# Patient Record
Sex: MALE | Race: WHITE | NOT HISPANIC OR LATINO | Employment: OTHER | URBAN - METROPOLITAN AREA
[De-identification: names, ages, dates, MRNs, and addresses within clinical notes are randomized per-mention and may not be internally consistent; named-entity substitution may affect disease eponyms.]

---

## 2018-03-13 ENCOUNTER — COMMUNICATION - HEALTHEAST (OUTPATIENT)
Dept: INTERNAL MEDICINE | Facility: CLINIC | Age: 68
End: 2018-03-13

## 2018-03-13 ENCOUNTER — OFFICE VISIT - HEALTHEAST (OUTPATIENT)
Dept: INTERNAL MEDICINE | Facility: CLINIC | Age: 68
End: 2018-03-13

## 2018-03-13 DIAGNOSIS — Z00.00 ENCOUNTER FOR PREVENTIVE HEALTH EXAMINATION: ICD-10-CM

## 2018-03-13 DIAGNOSIS — Z23 INFLUENZA VACCINATION GIVEN: ICD-10-CM

## 2018-03-13 DIAGNOSIS — Z12.11 SCREENING FOR COLON CANCER: ICD-10-CM

## 2018-03-13 DIAGNOSIS — Z00.00 MEDICARE ANNUAL WELLNESS VISIT, INITIAL: ICD-10-CM

## 2018-03-13 DIAGNOSIS — E78.5 HYPERLIPIDEMIA: ICD-10-CM

## 2018-03-13 DIAGNOSIS — Z12.5 SCREENING FOR PROSTATE CANCER: ICD-10-CM

## 2018-03-13 DIAGNOSIS — Z76.89 ENCOUNTER TO ESTABLISH CARE WITH NEW DOCTOR: ICD-10-CM

## 2018-03-13 LAB
ALBUMIN SERPL-MCNC: 3.8 G/DL (ref 3.5–5)
ALP SERPL-CCNC: 55 U/L (ref 45–120)
ALT SERPL W P-5'-P-CCNC: 23 U/L (ref 0–45)
ANION GAP SERPL CALCULATED.3IONS-SCNC: 10 MMOL/L (ref 5–18)
AST SERPL W P-5'-P-CCNC: 18 U/L (ref 0–40)
BASOPHILS # BLD AUTO: 0 THOU/UL (ref 0–0.2)
BASOPHILS NFR BLD AUTO: 1 % (ref 0–2)
BILIRUB SERPL-MCNC: 0.6 MG/DL (ref 0–1)
BUN SERPL-MCNC: 16 MG/DL (ref 8–22)
CALCIUM SERPL-MCNC: 9.3 MG/DL (ref 8.5–10.5)
CHLORIDE BLD-SCNC: 100 MMOL/L (ref 98–107)
CHOLEST SERPL-MCNC: 279 MG/DL
CO2 SERPL-SCNC: 26 MMOL/L (ref 22–31)
CREAT SERPL-MCNC: 0.93 MG/DL (ref 0.7–1.3)
EOSINOPHIL # BLD AUTO: 0.3 THOU/UL (ref 0–0.4)
EOSINOPHIL NFR BLD AUTO: 5 % (ref 0–6)
ERYTHROCYTE [DISTWIDTH] IN BLOOD BY AUTOMATED COUNT: 12.2 % (ref 11–14.5)
FASTING STATUS PATIENT QL REPORTED: YES
GFR SERPL CREATININE-BSD FRML MDRD: >60 ML/MIN/1.73M2
GLUCOSE BLD-MCNC: 91 MG/DL (ref 70–125)
HCT VFR BLD AUTO: 46.5 % (ref 40–54)
HDLC SERPL-MCNC: 44 MG/DL
HGB BLD-MCNC: 15.7 G/DL (ref 14–18)
LDLC SERPL CALC-MCNC: 206 MG/DL
LYMPHOCYTES # BLD AUTO: 2.1 THOU/UL (ref 0.8–4.4)
LYMPHOCYTES NFR BLD AUTO: 34 % (ref 20–40)
MCH RBC QN AUTO: 31.1 PG (ref 27–34)
MCHC RBC AUTO-ENTMCNC: 33.8 G/DL (ref 32–36)
MCV RBC AUTO: 92 FL (ref 80–100)
MONOCYTES # BLD AUTO: 0.5 THOU/UL (ref 0–0.9)
MONOCYTES NFR BLD AUTO: 8 % (ref 2–10)
NEUTROPHILS # BLD AUTO: 3.2 THOU/UL (ref 2–7.7)
NEUTROPHILS NFR BLD AUTO: 52 % (ref 50–70)
PLATELET # BLD AUTO: 215 THOU/UL (ref 140–440)
PMV BLD AUTO: 6.3 FL (ref 7–10)
POTASSIUM BLD-SCNC: 4.3 MMOL/L (ref 3.5–5)
PROT SERPL-MCNC: 7.2 G/DL (ref 6–8)
PSA SERPL-MCNC: 0.8 NG/ML (ref 0–4.5)
RBC # BLD AUTO: 5.04 MILL/UL (ref 4.4–6.2)
SODIUM SERPL-SCNC: 136 MMOL/L (ref 136–145)
TRIGL SERPL-MCNC: 147 MG/DL
WBC: 6 THOU/UL (ref 4–11)

## 2018-03-13 ASSESSMENT — MIFFLIN-ST. JEOR: SCORE: 1765.68

## 2018-03-19 ENCOUNTER — COMMUNICATION - HEALTHEAST (OUTPATIENT)
Dept: INTERNAL MEDICINE | Facility: CLINIC | Age: 68
End: 2018-03-19

## 2018-03-20 ENCOUNTER — AMBULATORY - HEALTHEAST (OUTPATIENT)
Dept: INTERNAL MEDICINE | Facility: CLINIC | Age: 68
End: 2018-03-20

## 2018-03-20 ENCOUNTER — COMMUNICATION - HEALTHEAST (OUTPATIENT)
Dept: INTERNAL MEDICINE | Facility: CLINIC | Age: 68
End: 2018-03-20

## 2018-08-06 ENCOUNTER — OFFICE VISIT - HEALTHEAST (OUTPATIENT)
Dept: INTERNAL MEDICINE | Facility: CLINIC | Age: 68
End: 2018-08-06

## 2018-08-06 DIAGNOSIS — M10.9 ACUTE GOUT: ICD-10-CM

## 2018-08-06 DIAGNOSIS — Z12.11 SCREENING FOR COLON CANCER: ICD-10-CM

## 2018-08-06 DIAGNOSIS — E78.2 MIXED HYPERLIPIDEMIA: ICD-10-CM

## 2018-08-06 LAB
ALBUMIN SERPL-MCNC: 3.8 G/DL (ref 3.5–5)
ALP SERPL-CCNC: 67 U/L (ref 45–120)
ALT SERPL W P-5'-P-CCNC: 25 U/L (ref 0–45)
ANION GAP SERPL CALCULATED.3IONS-SCNC: 9 MMOL/L (ref 5–18)
AST SERPL W P-5'-P-CCNC: 20 U/L (ref 0–40)
BILIRUB SERPL-MCNC: 1.2 MG/DL (ref 0–1)
BUN SERPL-MCNC: 11 MG/DL (ref 8–22)
CALCIUM SERPL-MCNC: 9.4 MG/DL (ref 8.5–10.5)
CHLORIDE BLD-SCNC: 103 MMOL/L (ref 98–107)
CHOLEST SERPL-MCNC: 163 MG/DL
CO2 SERPL-SCNC: 26 MMOL/L (ref 22–31)
CREAT SERPL-MCNC: 0.94 MG/DL (ref 0.7–1.3)
FASTING STATUS PATIENT QL REPORTED: YES
GFR SERPL CREATININE-BSD FRML MDRD: >60 ML/MIN/1.73M2
GLUCOSE BLD-MCNC: 90 MG/DL (ref 70–125)
HDLC SERPL-MCNC: 38 MG/DL
LDLC SERPL CALC-MCNC: 105 MG/DL
POTASSIUM BLD-SCNC: 4.2 MMOL/L (ref 3.5–5)
PROT SERPL-MCNC: 7 G/DL (ref 6–8)
SODIUM SERPL-SCNC: 138 MMOL/L (ref 136–145)
TRIGL SERPL-MCNC: 101 MG/DL

## 2018-08-07 ENCOUNTER — AMBULATORY - HEALTHEAST (OUTPATIENT)
Dept: LAB | Facility: CLINIC | Age: 68
End: 2018-08-07

## 2018-08-07 DIAGNOSIS — M10.9 ACUTE GOUT: ICD-10-CM

## 2018-08-07 LAB — URATE SERPL-MCNC: 6.8 MG/DL (ref 3–8)

## 2018-08-08 ENCOUNTER — COMMUNICATION - HEALTHEAST (OUTPATIENT)
Dept: INTERNAL MEDICINE | Facility: CLINIC | Age: 68
End: 2018-08-08

## 2018-08-08 LAB
SPECIMEN VOL UR: 1600 ML
URATE UR-MCNC: 49.3 MG/DL
URIC ACID URINE MG/SPEC: 789 MG/24HR (ref 250–750)

## 2019-03-27 ENCOUNTER — COMMUNICATION - HEALTHEAST (OUTPATIENT)
Dept: INTERNAL MEDICINE | Facility: CLINIC | Age: 69
End: 2019-03-27

## 2019-03-27 DIAGNOSIS — R06.83 SNORING: ICD-10-CM

## 2019-04-03 ENCOUNTER — COMMUNICATION - HEALTHEAST (OUTPATIENT)
Dept: INTERNAL MEDICINE | Facility: CLINIC | Age: 69
End: 2019-04-03

## 2019-04-03 DIAGNOSIS — E78.2 MIXED HYPERLIPIDEMIA: ICD-10-CM

## 2019-05-10 ENCOUNTER — COMMUNICATION - HEALTHEAST (OUTPATIENT)
Dept: INTERNAL MEDICINE | Facility: CLINIC | Age: 69
End: 2019-05-10

## 2019-06-07 ENCOUNTER — OFFICE VISIT - HEALTHEAST (OUTPATIENT)
Dept: SLEEP MEDICINE | Facility: CLINIC | Age: 69
End: 2019-06-07

## 2019-06-07 DIAGNOSIS — G47.8 SLEEP DYSFUNCTION WITH SLEEP STAGE DISTURBANCE: ICD-10-CM

## 2019-06-07 DIAGNOSIS — R06.83 SNORING: ICD-10-CM

## 2019-06-07 DIAGNOSIS — R03.0 ELEVATED BLOOD PRESSURE READING: ICD-10-CM

## 2019-06-07 DIAGNOSIS — G47.10 HYPERSOMNIA: ICD-10-CM

## 2019-06-07 ASSESSMENT — MIFFLIN-ST. JEOR: SCORE: 1772.77

## 2019-07-01 ENCOUNTER — COMMUNICATION - HEALTHEAST (OUTPATIENT)
Dept: SLEEP MEDICINE | Facility: CLINIC | Age: 69
End: 2019-07-01

## 2019-07-01 DIAGNOSIS — G47.8 SLEEP DYSFUNCTION WITH SLEEP STAGE DISTURBANCE: ICD-10-CM

## 2019-07-01 DIAGNOSIS — R06.83 SNORING: ICD-10-CM

## 2019-07-01 DIAGNOSIS — G47.10 HYPERSOMNIA: ICD-10-CM

## 2019-07-16 ENCOUNTER — RECORDS - HEALTHEAST (OUTPATIENT)
Dept: ADMINISTRATIVE | Facility: OTHER | Age: 69
End: 2019-07-16

## 2019-07-16 ENCOUNTER — RECORDS - HEALTHEAST (OUTPATIENT)
Dept: SLEEP MEDICINE | Facility: CLINIC | Age: 69
End: 2019-07-16

## 2019-07-16 DIAGNOSIS — R06.83 SNORING: ICD-10-CM

## 2019-07-16 DIAGNOSIS — G47.8 OTHER SLEEP DISORDERS: ICD-10-CM

## 2019-07-16 DIAGNOSIS — G47.10 HYPERSOMNIA, UNSPECIFIED: ICD-10-CM

## 2019-07-19 ENCOUNTER — COMMUNICATION - HEALTHEAST (OUTPATIENT)
Dept: SLEEP MEDICINE | Facility: CLINIC | Age: 69
End: 2019-07-19

## 2019-08-29 ENCOUNTER — OFFICE VISIT - HEALTHEAST (OUTPATIENT)
Dept: SLEEP MEDICINE | Facility: CLINIC | Age: 69
End: 2019-08-29

## 2019-08-29 DIAGNOSIS — G47.33 OBSTRUCTIVE SLEEP APNEA: ICD-10-CM

## 2019-08-29 DIAGNOSIS — G47.10 HYPERSOMNIA: ICD-10-CM

## 2019-08-29 ASSESSMENT — MIFFLIN-ST. JEOR: SCORE: 1781.84

## 2019-09-10 ENCOUNTER — AMBULATORY - HEALTHEAST (OUTPATIENT)
Dept: SLEEP MEDICINE | Facility: CLINIC | Age: 69
End: 2019-09-10

## 2019-10-11 ENCOUNTER — COMMUNICATION - HEALTHEAST (OUTPATIENT)
Dept: INTERNAL MEDICINE | Facility: CLINIC | Age: 69
End: 2019-10-11

## 2019-10-11 DIAGNOSIS — E78.2 MIXED HYPERLIPIDEMIA: ICD-10-CM

## 2019-10-22 ENCOUNTER — OFFICE VISIT - HEALTHEAST (OUTPATIENT)
Dept: SLEEP MEDICINE | Facility: CLINIC | Age: 69
End: 2019-10-22

## 2019-10-22 DIAGNOSIS — R03.0 ELEVATED BLOOD PRESSURE READING: ICD-10-CM

## 2019-10-22 DIAGNOSIS — G47.33 OBSTRUCTIVE SLEEP APNEA: ICD-10-CM

## 2019-10-22 DIAGNOSIS — G47.10 HYPERSOMNIA: ICD-10-CM

## 2019-10-22 ASSESSMENT — MIFFLIN-ST. JEOR: SCORE: 1818.13

## 2019-10-24 ENCOUNTER — OFFICE VISIT - HEALTHEAST (OUTPATIENT)
Dept: INTERNAL MEDICINE | Facility: CLINIC | Age: 69
End: 2019-10-24

## 2019-10-24 DIAGNOSIS — M54.41 ACUTE RIGHT-SIDED LOW BACK PAIN WITH RIGHT-SIDED SCIATICA: ICD-10-CM

## 2019-10-24 ASSESSMENT — MIFFLIN-ST. JEOR: SCORE: 1799.98

## 2019-10-29 ENCOUNTER — COMMUNICATION - HEALTHEAST (OUTPATIENT)
Dept: INTERNAL MEDICINE | Facility: CLINIC | Age: 69
End: 2019-10-29

## 2019-10-29 DIAGNOSIS — M54.41 ACUTE BILATERAL LOW BACK PAIN WITH RIGHT-SIDED SCIATICA: ICD-10-CM

## 2019-11-06 ENCOUNTER — HOSPITAL ENCOUNTER (OUTPATIENT)
Dept: MRI IMAGING | Facility: CLINIC | Age: 69
Discharge: HOME | End: 2019-11-06
Attending: INTERNAL MEDICINE

## 2019-11-06 DIAGNOSIS — M54.41 ACUTE BILATERAL LOW BACK PAIN WITH RIGHT-SIDED SCIATICA: ICD-10-CM

## 2019-11-10 ENCOUNTER — COMMUNICATION - HEALTHEAST (OUTPATIENT)
Dept: INTERNAL MEDICINE | Facility: CLINIC | Age: 69
End: 2019-11-10

## 2019-11-10 DIAGNOSIS — M54.41 ACUTE BILATERAL LOW BACK PAIN WITH RIGHT-SIDED SCIATICA: ICD-10-CM

## 2019-11-10 DIAGNOSIS — M48.061 SPINAL STENOSIS, LUMBAR REGION, WITHOUT NEUROGENIC CLAUDICATION: ICD-10-CM

## 2019-11-19 ENCOUNTER — COMMUNICATION - HEALTHEAST (OUTPATIENT)
Dept: PHYSICAL MEDICINE AND REHAB | Facility: CLINIC | Age: 69
End: 2019-11-19

## 2019-11-19 ENCOUNTER — HOSPITAL ENCOUNTER (OUTPATIENT)
Dept: PHYSICAL MEDICINE AND REHAB | Facility: CLINIC | Age: 69
Discharge: HOME | End: 2019-11-19
Attending: INTERNAL MEDICINE

## 2019-11-19 DIAGNOSIS — M48.061 SPINAL STENOSIS OF LUMBAR REGION WITHOUT NEUROGENIC CLAUDICATION: ICD-10-CM

## 2019-11-19 DIAGNOSIS — M54.16 LUMBAR RADICULITIS: ICD-10-CM

## 2019-11-19 ASSESSMENT — MIFFLIN-ST. JEOR: SCORE: 1799.08

## 2019-11-26 ENCOUNTER — COMMUNICATION - HEALTHEAST (OUTPATIENT)
Dept: PHYSICAL MEDICINE AND REHAB | Facility: CLINIC | Age: 69
End: 2019-11-26

## 2019-11-27 ENCOUNTER — OFFICE VISIT - HEALTHEAST (OUTPATIENT)
Dept: PHYSICAL THERAPY | Facility: REHABILITATION | Age: 69
End: 2019-11-27

## 2019-11-27 DIAGNOSIS — R29.6 FALLS FREQUENTLY: ICD-10-CM

## 2019-11-27 DIAGNOSIS — M62.81 GENERALIZED MUSCLE WEAKNESS: ICD-10-CM

## 2019-11-27 DIAGNOSIS — M54.41 ACUTE RIGHT-SIDED LOW BACK PAIN WITH RIGHT-SIDED SCIATICA: ICD-10-CM

## 2019-12-02 ENCOUNTER — COMMUNICATION - HEALTHEAST (OUTPATIENT)
Dept: PHYSICAL THERAPY | Facility: REHABILITATION | Age: 69
End: 2019-12-02

## 2019-12-05 ENCOUNTER — OFFICE VISIT - HEALTHEAST (OUTPATIENT)
Dept: PHYSICAL THERAPY | Facility: REHABILITATION | Age: 69
End: 2019-12-05

## 2019-12-05 ENCOUNTER — HOSPITAL ENCOUNTER (OUTPATIENT)
Dept: PHYSICAL MEDICINE AND REHAB | Facility: CLINIC | Age: 69
Discharge: HOME | End: 2019-12-05
Attending: PHYSICIAN ASSISTANT

## 2019-12-05 DIAGNOSIS — R29.6 FALLS FREQUENTLY: ICD-10-CM

## 2019-12-05 DIAGNOSIS — M62.81 GENERALIZED MUSCLE WEAKNESS: ICD-10-CM

## 2019-12-05 DIAGNOSIS — M48.061 SPINAL STENOSIS OF LUMBAR REGION WITHOUT NEUROGENIC CLAUDICATION: ICD-10-CM

## 2019-12-05 DIAGNOSIS — M54.41 ACUTE RIGHT-SIDED LOW BACK PAIN WITH RIGHT-SIDED SCIATICA: ICD-10-CM

## 2019-12-11 ENCOUNTER — COMMUNICATION - HEALTHEAST (OUTPATIENT)
Dept: PHYSICAL MEDICINE AND REHAB | Facility: CLINIC | Age: 69
End: 2019-12-11

## 2019-12-11 DIAGNOSIS — M48.061 SPINAL STENOSIS OF LUMBAR REGION WITHOUT NEUROGENIC CLAUDICATION: ICD-10-CM

## 2019-12-12 ENCOUNTER — OFFICE VISIT - HEALTHEAST (OUTPATIENT)
Dept: PHYSICAL THERAPY | Facility: REHABILITATION | Age: 69
End: 2019-12-12

## 2019-12-12 DIAGNOSIS — M54.41 ACUTE RIGHT-SIDED LOW BACK PAIN WITH RIGHT-SIDED SCIATICA: ICD-10-CM

## 2019-12-12 DIAGNOSIS — M62.81 GENERALIZED MUSCLE WEAKNESS: ICD-10-CM

## 2019-12-12 DIAGNOSIS — R29.6 FALLS FREQUENTLY: ICD-10-CM

## 2019-12-19 ENCOUNTER — HOSPITAL ENCOUNTER (OUTPATIENT)
Dept: PHYSICAL MEDICINE AND REHAB | Facility: CLINIC | Age: 69
Discharge: HOME | End: 2019-12-19
Attending: PHYSICIAN ASSISTANT

## 2019-12-19 ENCOUNTER — OFFICE VISIT - HEALTHEAST (OUTPATIENT)
Dept: PHYSICAL THERAPY | Facility: REHABILITATION | Age: 69
End: 2019-12-19

## 2019-12-19 DIAGNOSIS — M48.061 SPINAL STENOSIS OF LUMBAR REGION WITHOUT NEUROGENIC CLAUDICATION: ICD-10-CM

## 2019-12-19 DIAGNOSIS — M54.41 ACUTE RIGHT-SIDED LOW BACK PAIN WITH RIGHT-SIDED SCIATICA: ICD-10-CM

## 2019-12-19 DIAGNOSIS — R29.6 FALLS FREQUENTLY: ICD-10-CM

## 2019-12-19 DIAGNOSIS — M54.16 LUMBAR RADICULITIS: ICD-10-CM

## 2019-12-19 DIAGNOSIS — M62.81 GENERALIZED MUSCLE WEAKNESS: ICD-10-CM

## 2019-12-24 ENCOUNTER — OFFICE VISIT - HEALTHEAST (OUTPATIENT)
Dept: PHYSICAL THERAPY | Facility: REHABILITATION | Age: 69
End: 2019-12-24

## 2019-12-24 DIAGNOSIS — M54.41 ACUTE RIGHT-SIDED LOW BACK PAIN WITH RIGHT-SIDED SCIATICA: ICD-10-CM

## 2019-12-24 DIAGNOSIS — M62.81 GENERALIZED MUSCLE WEAKNESS: ICD-10-CM

## 2019-12-24 DIAGNOSIS — R29.6 FALLS FREQUENTLY: ICD-10-CM

## 2020-01-09 ENCOUNTER — OFFICE VISIT - HEALTHEAST (OUTPATIENT)
Dept: PHYSICAL THERAPY | Facility: REHABILITATION | Age: 70
End: 2020-01-09

## 2020-01-09 DIAGNOSIS — M62.81 GENERALIZED MUSCLE WEAKNESS: ICD-10-CM

## 2020-01-09 DIAGNOSIS — R29.6 FALLS FREQUENTLY: ICD-10-CM

## 2020-01-09 DIAGNOSIS — M54.41 ACUTE RIGHT-SIDED LOW BACK PAIN WITH RIGHT-SIDED SCIATICA: ICD-10-CM

## 2020-04-08 ENCOUNTER — COMMUNICATION - HEALTHEAST (OUTPATIENT)
Dept: INTERNAL MEDICINE | Facility: CLINIC | Age: 70
End: 2020-04-08

## 2020-04-08 DIAGNOSIS — E78.2 MIXED HYPERLIPIDEMIA: ICD-10-CM

## 2020-11-16 ENCOUNTER — COMMUNICATION - HEALTHEAST (OUTPATIENT)
Dept: INTERNAL MEDICINE | Facility: CLINIC | Age: 70
End: 2020-11-16

## 2020-11-16 DIAGNOSIS — E78.2 MIXED HYPERLIPIDEMIA: ICD-10-CM

## 2021-01-29 ENCOUNTER — COMMUNICATION - HEALTHEAST (OUTPATIENT)
Dept: SCHEDULING | Facility: CLINIC | Age: 71
End: 2021-01-29

## 2021-02-09 ENCOUNTER — COMMUNICATION - HEALTHEAST (OUTPATIENT)
Dept: INTERNAL MEDICINE | Facility: CLINIC | Age: 71
End: 2021-02-09

## 2021-02-09 DIAGNOSIS — E78.2 MIXED HYPERLIPIDEMIA: ICD-10-CM

## 2021-02-12 ENCOUNTER — COMMUNICATION - HEALTHEAST (OUTPATIENT)
Dept: INTERNAL MEDICINE | Facility: CLINIC | Age: 71
End: 2021-02-12

## 2021-02-12 DIAGNOSIS — E78.2 MIXED HYPERLIPIDEMIA: ICD-10-CM

## 2021-02-15 RX ORDER — ATORVASTATIN CALCIUM 40 MG/1
TABLET, FILM COATED ORAL
Qty: 90 TABLET | Refills: 3 | Status: SHIPPED | OUTPATIENT
Start: 2021-02-15 | End: 2022-04-27

## 2021-04-28 ENCOUNTER — OFFICE VISIT - HEALTHEAST (OUTPATIENT)
Dept: INTERNAL MEDICINE | Facility: CLINIC | Age: 71
End: 2021-04-28

## 2021-04-28 DIAGNOSIS — R05.9 COUGH: ICD-10-CM

## 2021-05-27 NOTE — TELEPHONE ENCOUNTER
Refill Approved    Rx renewed per Medication Renewal Policy. Medication was last renewed on 3/20/18  #30 R-12.    Last OV 8/6/18    Madelaine Robles, [facility] Triage/Med Refill 4/4/2019     Requested Prescriptions   Pending Prescriptions Disp Refills     atorvastatin (LIPITOR) 40 MG tablet 90 tablet 0     Sig: Take 1 tablet (40 mg total) by mouth at bedtime.    Statins Refill Protocol (Hmg CoA Reductase Inhibitors) Passed - 4/3/2019 12:58 PM       Passed - PCP or prescribing provider visit in past 12 months     Last office visit with prescriber/PCP: 8/6/2018 Ryan Byrd MD OR same dept: 8/6/2018 Ryan Byrd MD OR same specialty: 8/6/2018 Ryan Byrd MD  Last physical: 3/13/2018 Last MTM visit: Visit date not found   Next visit within 3 mo: Visit date not found  Next physical within 3 mo: Visit date not found  Prescriber OR PCP: Ryan Byrd MD  Last diagnosis associated with med order: There are no diagnoses linked to this encounter.  If protocol passes may refill for 12 months if within 3 months of last provider visit (or a total of 15 months).

## 2021-05-29 NOTE — PROGRESS NOTES
Dear Dr. Byrd, Ryan ROGERS Md  [address] [place] [address]    Thank you for the opportunity to participate in the care of Mr. Delmar Archuleta.    He is a 68 y.o. male who comes to the clinic with a chief complaint of excessive daytime sleepiness is been going on for approximately a year.  Patient denies any episodes witnessed apnea he has been told that he does have loud snoring during sleep.  The patient also complains of frequent episodes of nocturnal awakening.  The patient's review of systems is otherwise negative.     Ideal Sleep-Wake Cycle(devoid of societal pressure):    Patient would try to initiate sleep at around 10:30 PM with a sleep latency of 5-10 minutes. The patient would have 2-3 awakenings. Final wake up time is around 7 AM.      Past Medical History  Past Medical History:   Diagnosis Date     Colon polyps      History of arm fracture      Hyperlipidemia         Past Surgical History  Past Surgical History:   Procedure Laterality Date     NO PAST SURGERIES          Meds  Current Outpatient Medications   Medication Sig Dispense Refill     aspirin 81 MG EC tablet Take 81 mg by mouth daily.       atorvastatin (LIPITOR) 40 MG tablet Take 1 tablet (40 mg total) by mouth at bedtime. 90 tablet 1     No current facility-administered medications for this visit.         Allergies  Patient has no known allergies.     Social History  Social History     Socioeconomic History     Marital status: Single     Spouse name: Not on file     Number of children: 2     Years of education: Not on file     Highest education level: Not on file   Occupational History     Occupation: building supply   Social Needs     Financial resource strain: Not on file     Food insecurity:     Worry: Not on file     Inability: Not on file     Transportation needs:     Medical: Not on file     Non-medical: Not on file   Tobacco Use     Smoking status: Current Every Day Smoker     Types: Cigars     Smokeless tobacco: Never  Used   Substance and Sexual Activity     Alcohol use: Yes     Alcohol/week: 4.8 oz     Types: 8 Cans of beer per week     Drug use: Not on file     Sexual activity: Yes     Partners: Female   Lifestyle     Physical activity:     Days per week: Not on file     Minutes per session: Not on file     Stress: Not on file   Relationships     Social connections:     Talks on phone: Not on file     Gets together: Not on file     Attends Congregation service: Not on file     Active member of club or organization: Not on file     Attends meetings of clubs or organizations: Not on file     Relationship status: Not on file     Intimate partner violence:     Fear of current or ex partner: Not on file     Emotionally abused: Not on file     Physically abused: Not on file     Forced sexual activity: Not on file   Other Topics Concern     Not on file   Social History Narrative    Grandkids, golf        Family History  Family History   Problem Relation Age of Onset     Coronary artery disease Father      Breast cancer Sister      Coronary artery disease Brother         Review of Systems:  Constitutional: Negative except as noted in HPI.   Eyes: Negative except as noted in HPI.   ENT: Negative except as noted in HPI.   Cardiovascular: Negative except as noted in HPI.   Respiratory: Negative except as noted in HPI.   Gastrointestinal: Negative except as noted in HPI.   Genitourinary: Negative except as noted in HPI.   Musculoskeletal: Negative except as noted in HPI.   Integumentary: Negative except as noted in HPI.   Neurological: Negative except as noted in HPI.   Psychiatric: Negative except as noted in HPI.   Endocrine: Negative except as noted in HPI.   Hematologic/Lymphatic: Negative except as noted in HPI.      STOP BANG 6/7/2019   Do you snore loudly (louder than talking or loud enough to be heard through closed doors)? 1   Do you often feel tired, fatigued, or sleepy during daytime? 1   Has anyone observed you stop breathing in  "your sleep? 0   Do you have or are you being treated for high blood pressure? 0   BMI more than 35 kg/m2 0   Age over 50 years old? 1   Neck circumference greater than 16 inches? 1   Gender male? 1   Total Score 5     Epworths Sleepiness Scale 6/7/2019   Sitting and reading 1   Watching TV 1   Sitting, inactive in a public place (e.g. a theatre or a meeting) 0   As a passenger in a car for an hour without a break 0   Lying down to rest in the afternoon when circumstances permit 2   Sitting and talking to someone 0   Sitting quietly after a lunch without alcohol 1   In a car, while stopped for a few minutes in traffic 0   Total score 5     Rooming 6/7/2019   Usual bedtime 10:30   Sleep Latency 5-10 minutes   Awakenings 2-3 times   Wake Up Time 7am   Weekends same   Energy Drinks no   Coffee 3 cups qd   Cola no   Difficulty falling asleep No   Difficulty staying asleep Yes   Excessive daytime tiredness Yes   Excessive daytime sleepiness Yes   Dozing off while driving No   Shift Worker No   Sleep Walking? No   Sleep Talking? No   Kicking or punching? No   Restless legs symptoms Yes       Physical Exam:  /83   Pulse 66   Ht 6' 2\" (1.88 m)   Wt 209 lb (94.8 kg)   SpO2 95%   BMI 26.83 kg/m    BMI:Body mass index is 26.83 kg/m .   GEN: NAD, appropriate for age  Head: Normocephalic.  EYES: PERRLA, EOMI  ENT: Oropharynx is clear, Nathan class 4+ airway.   Nasal mucosa is moist without erythema  Neck : Thyroid is within normal limits.  Neck circumference 16.75 inches  CV: Regular rate and rhythm, S1 & S2 positive.  LUNGS: Bilateral breathsounds heard.   ABDOMEN: Positive bowel sounds in all quadrants, soft, no rebound or guarding  MUSCULOSKELETAL: Bilateral trace leg swelling  SKIN: warm, dry, no rashes  Neurological: Alert, oriented to time, place, and person.  Psych: normal mood, normal affect     Labs/Studies:     Lab Results   Component Value Date    WBC 6.0 03/13/2018    HGB 15.7 03/13/2018    HCT 46.5 " 03/13/2018    MCV 92 03/13/2018     03/13/2018         Chemistry        Component Value Date/Time     08/06/2018 1116    K 4.2 08/06/2018 1116     08/06/2018 1116    CO2 26 08/06/2018 1116    BUN 11 08/06/2018 1116    CREATININE 0.94 08/06/2018 1116    GLU 90 08/06/2018 1116        Component Value Date/Time    CALCIUM 9.4 08/06/2018 1116    ALKPHOS 67 08/06/2018 1116    AST 20 08/06/2018 1116    ALT 25 08/06/2018 1116    BILITOT 1.2 (H) 08/06/2018 1116            No results found for: FERRITIN  No results found for: TSH      Assessment and Plan:  In summary Delmar Archuleta is a 68 y.o. year old male here for sleep disturbance.  1.  Hypersomnia   Mr. Delmar Archuleta has high risk for obstructive sleep apnea based on the history of hypersomnia, snoring and a crowded airway. I educated the patient on the underlying pathophysiology of obstructive sleep apnea. We reviewed the risks associated with sleep apnea, including increased cardiovascular risk and overall death. We talked about treatments briefly. I recommend getting an split-night nocturnal polysomnography. The patient should return to the clinic to discuss results and treatment option in a patient-centered approach.  2.  Snoring  3.  Elevated blood pressure reading  I will have the patient's blood pressure readings repeated during this clinic visit. I strongly advised the patient to follow up with PCP in one week.  4.  Other sleep disturbance    Patient verbalized understanding of these issues, agrees with the plan and all questions were answered today. Patient was given an opportuntity to voice any other symptoms or concerns not listed above. Patient did not have any other symptoms or concerns.         Leo Mckinley DO  Board Certified in Internal Medicine and Sleep Medicine  [facility].    (Note created with Dragon voice recognition and unintended spelling errors and word substitutions may occur)

## 2021-05-30 ENCOUNTER — RECORDS - HEALTHEAST (OUTPATIENT)
Dept: ADMINISTRATIVE | Facility: CLINIC | Age: 71
End: 2021-05-30

## 2021-05-30 NOTE — TELEPHONE ENCOUNTER
Please call the patient to schedule for an earlier follow-up visit to review the results of the sleep study.  Please schedule patient to follow-up with me within 6 weeks.  May double book except for my 1/2 days.Thank you.

## 2021-05-30 NOTE — TELEPHONE ENCOUNTER
----- Message from Marian Rowland, PSGT sent at 6/26/2019  3:34 PM CDT -----  Hi Dr Mckinley-  This patients in lab study was denied by insurance. I had Dr PUCKETT look at it and he didn't think there was enough co morbidities to leave as an in lab. Can you please change to an HST?  Thanks!  Marian

## 2021-05-31 VITALS — HEIGHT: 74 IN | WEIGHT: 207.44 LBS | BODY MASS INDEX: 26.62 KG/M2

## 2021-05-31 NOTE — PROGRESS NOTES
"Dear Dr. Byrd, Ryan ROGERS MD  [address] [place] [address],    Thank you for the opportunity to participate in the care of Delmar Archuleta.     He is a 68 y.o.  male patient who comes to the sleep medicine clinic for review of his sleep study. The study was completed on 07/16/2019 which showed the the patient had mild obstructive sleep apnea      Current Outpatient Medications   Medication Sig Dispense Refill     aspirin 81 MG EC tablet Take 81 mg by mouth daily.       atorvastatin (LIPITOR) 40 MG tablet Take 1 tablet (40 mg total) by mouth at bedtime. 90 tablet 1     No current facility-administered medications for this visit.        No Known Allergies    Epworths Sleepiness Scale 6/7/2019   Sitting and reading 1   Watching TV 1   Sitting, inactive in a public place (e.g. a theatre or a meeting) 0   As a passenger in a car for an hour without a break 0   Lying down to rest in the afternoon when circumstances permit 2   Sitting and talking to someone 0   Sitting quietly after a lunch without alcohol 1   In a car, while stopped for a few minutes in traffic 0   Total score 5       Physical Exam:  /83 (Patient Site: Left Arm)   Pulse 68   Ht 6' 2\" (1.88 m)   Wt 211 lb (95.7 kg)   SpO2 95%   BMI 27.09 kg/m    BMI:Body mass index is 27.09 kg/m .   GEN: NAD, obese  Psych: normal mood, normal affect     Labs/Studies:  - We reviewed the results of the overnight PSG as described on the HPI.     Lab Results   Component Value Date    WBC 6.0 03/13/2018    HGB 15.7 03/13/2018    HCT 46.5 03/13/2018    MCV 92 03/13/2018     03/13/2018         Chemistry        Component Value Date/Time     08/06/2018 1116    K 4.2 08/06/2018 1116     08/06/2018 1116    CO2 26 08/06/2018 1116    BUN 11 08/06/2018 1116    CREATININE 0.94 08/06/2018 1116    GLU 90 08/06/2018 1116        Component Value Date/Time    CALCIUM 9.4 08/06/2018 1116    ALKPHOS 67 08/06/2018 1116    AST 20 08/06/2018 1116    ALT " 25 08/06/2018 1116    BILITOT 1.2 (H) 08/06/2018 1116            No results found for: FERRITIN        Assessment and Plan:  In summary Delmar Archuleta is a 68 y.o. year old male here for review of his sleep study.  1. Obstructive Sleep Apnea  We had an extensive conversation to review the results of his sleep study and to  him on the importance of treating sleep apnea. Patient decided to proceed with CPAP. He will start using the device as soon as he receives it with the intention to use if for the entire night. We discussed some tips to increase PAP tolerance as well as the normal curve of adaptation. CPAP is going to provide improved respiratory function during the night but it can cause some sleep disruption that tends to improve with continuous usage. He should return to the clinic in 6 weeks to review compliance and efficacy monitoring. Since the patient does not have any preference, we will use [employer] as her [employer].   2.  Hypersomnia     Patient verbalized understanding of these issues, agrees with the plan and all questions were answered today. Patient was given an opportuntity to voice any other symptoms or concerns not listed above. Patient did not have any other symptoms or concerns.        Leo Mckinley DO  Board Certified in Internal Medicine and Sleep Medicine  [facility].      (Note created with Dragon voice recognition and unintended spelling errors and word substitutions may occur)

## 2021-05-31 NOTE — PROGRESS NOTES
Order for Durable Medical Equipment was processed and equipment ordered.     DME provider: [facility]    Date Faxed: 8/29/2019    Ordering Provider: Leo Mckinley DO    PAP Order Type: New Device Order    Fax Number: IN HOUSE DME: [id number]

## 2021-06-01 VITALS — BODY MASS INDEX: 26.19 KG/M2 | WEIGHT: 204 LBS

## 2021-06-01 NOTE — PROGRESS NOTES
Patient was offered choice of vendor and chose [facility].  Patient Delmar Archuleta was set up at [facility] on 9/16/19. Patient received a Resmed [id number] Auto. Pressures were set at 5-15.   Patient s ramp is 5 cm H2O for off and FLEX/EPR is EPR 2.  Patient received a Resmed Mask name: Steward FX  pillow Size med, heated tubing and heated humidifier.    Soe Her

## 2021-06-02 VITALS — HEIGHT: 74 IN | WEIGHT: 209 LBS | BODY MASS INDEX: 26.82 KG/M2

## 2021-06-02 NOTE — PROGRESS NOTES
ASSESSMENT AND PLAN:    1. Acute right-sided low back pain with right-sided sciatica  Exam and history are consistent with possible early sciatica pain.  Essentially only occurring at night.  Hip ROM is good, without focal tenderness.  No findings to suggest radiculopathy.  He is comfortable using pain rx, short term for now and following up if progresses. Possible this is trochanteric bursitis that may further more clearly develop.     - HYDROcodone-acetaminophen 5-325 mg per tablet; Take 1 tablet by mouth every 8 (eight) hours as needed (for acute pain).  Dispense: 18 tablet; Refill: 0    Patient Instructions   1. Discussed possible sciatica.       2. Use hydrocodone with acetaminophen sparingly     3. Follow as needed and in 6 months for general health.     CHIEF COMPLAINT:  Chief Complaint   Patient presents with     Leg Pain     X three days of right leg pain and mild lower back ache     HISTORY OF PRESENT ILLNESS:  Delmar Archuleta is a 68 y.o. male with lower right back pain and lateral RLE pain only occurring at night, the past few nights.  It doesn't seem to matter which side he lays on.  No tingling, or weakness of the RLE.  No voiding symtpoms.  No rash, no trauma.     REVIEW OF SYSTEMS:   See HPI, all other systems on review are negative.    Past Medical History:   Diagnosis Date     Colon polyps      History of arm fracture      Hyperlipidemia      Social History     Tobacco Use   Smoking Status Current Every Day Smoker     Types: Cigars   Smokeless Tobacco Never Used     Family History   Problem Relation Age of Onset     Coronary artery disease Father      Breast cancer Sister      Coronary artery disease Brother      Sleep apnea Brother      Past Surgical History:   Procedure Laterality Date     NO PAST SURGERIES       VITALS:  Vitals:    10/24/19 1457   BP: 136/80   Patient Site: Left Arm   Patient Position: Sitting   Cuff Size: Adult Regular   Pulse: 60   SpO2: 98%   Weight: 215 lb (97.5 kg)  "  Height: 6' 2\" (1.88 m)     Wt Readings from Last 3 Encounters:   10/24/19 215 lb (97.5 kg)   10/22/19 219 lb (99.3 kg)   08/29/19 211 lb (95.7 kg)     PHYSICAL EXAM:  Constitutional:  In NAD, alert and oriented  Abdomen:   Soft, flat and non-tender, without guarding, rebound, or mass.    Extremities: no joint effusion or inflammation, no significant edema  Neurologic:  SLR positive with hamstring pain bilaterally at about 50 degrees.  Reflexes are normal, no LE weakness.  Can walk on heels and toes.         Current Outpatient Medications   Medication Sig Dispense Refill     aspirin 81 MG EC tablet Take 81 mg by mouth daily.       atorvastatin (LIPITOR) 40 MG tablet Take 1 tablet (40 mg total) by mouth at bedtime. 90 tablet 1     HYDROcodone-acetaminophen 5-325 mg per tablet Take 1 tablet by mouth every 8 (eight) hours as needed (for acute pain). 18 tablet 0     No current facility-administered medications for this visit.      Ryan Bydr MD  Internal Medicine  [facility]  "

## 2021-06-02 NOTE — PATIENT INSTRUCTIONS - HE
Please follow up with your primary care provider to check your blood pressure in one week if clinically indicated.

## 2021-06-02 NOTE — PATIENT INSTRUCTIONS - HE
1. Discussed possible sciatica.       2. Use hydrocodone with acetaminophen sparingly     3. Follow as needed and in 6 months for general health.

## 2021-06-02 NOTE — PROGRESS NOTES
Dear Dr. Byrd, Ryan ROGERS MD  [address] [place] [address],    Thank you for the opportunity to participate in the care of Delmar Archuleta.     He is a 68 y.o. y/o male patient who comes to the sleep medicine clinic for follow up.  This is the patient's first clinical visit since starting CPAP therapy.  He reports that his sleep quality and energy level have both dramatically improved on CPAP.  He likes the current pressure settings away they are set.    Compliance Download data for 30 days:  Pressure setting: APAP 5-15 CWP  Residual AHI: 0.6 events per hour  Leak: Minimal  Compliance: 100%  Mask Tolerance: Good  Skin irritation: None    Past Medical History:   Diagnosis Date     Colon polyps      History of arm fracture      Hyperlipidemia        Past Surgical History:   Procedure Laterality Date     NO PAST SURGERIES         Social History     Socioeconomic History     Marital status: Single     Spouse name: Not on file     Number of children: 2     Years of education: Not on file     Highest education level: Not on file   Occupational History     Occupation: building supply   Social Needs     Financial resource strain: Not on file     Food insecurity:     Worry: Not on file     Inability: Not on file     Transportation needs:     Medical: Not on file     Non-medical: Not on file   Tobacco Use     Smoking status: Current Every Day Smoker     Types: Cigars     Smokeless tobacco: Never Used   Substance and Sexual Activity     Alcohol use: Yes     Alcohol/week: 8.0 standard drinks     Types: 8 Cans of beer per week     Drug use: Not on file     Sexual activity: Yes     Partners: Female   Lifestyle     Physical activity:     Days per week: Not on file     Minutes per session: Not on file     Stress: Not on file   Relationships     Social connections:     Talks on phone: Not on file     Gets together: Not on file     Attends [employer] service: Not on file     Active member of club or organization: Not  "on file     Attends meetings of clubs or organizations: Not on file     Relationship status: Not on file     Intimate partner violence:     Fear of current or ex partner: Not on file     Emotionally abused: Not on file     Physically abused: Not on file     Forced sexual activity: Not on file   Other Topics Concern     Not on file   Social History Narrative    Grandkids, golf       Current Outpatient Medications   Medication Sig Dispense Refill     aspirin 81 MG EC tablet Take 81 mg by mouth daily.       atorvastatin (LIPITOR) 40 MG tablet Take 1 tablet (40 mg total) by mouth at bedtime. 90 tablet 1     No current facility-administered medications for this visit.        No Known Allergies    Epworths Sleepiness Scale 6/7/2019 10/22/2019   Sitting and reading 1 1   Watching TV 1 1   Sitting, inactive in a public place (e.g. a theatre or a meeting) 0 0   As a passenger in a car for an hour without a break 0 0   Lying down to rest in the afternoon when circumstances permit 2 2   Sitting and talking to someone 0 0   Sitting quietly after a lunch without alcohol 1 0   In a car, while stopped for a few minutes in traffic 0 0   Total score 5 4       Physical Exam:  /89   Pulse (!) 57   Ht 6' 2\" (1.88 m)   Wt 219 lb (99.3 kg)   SpO2 96%   BMI 28.12 kg/m    BMI:Body mass index is 28.12 kg/m .   GEN: NAD, appropriate for age  Psych: normal mood, normal affect    Labs/Studies:    I reviewed the efficacy and compliance report from his device. Data summarized on the HPI and the PAP compliance flow sheet.       Assessment and Plan:  In summary Delmar Archuleta is a 68 y.o. year old male who is here for follow-up.    1.  Obstructive sleep apnea on CPAP  I congratulated the patient on his excellent CPAP usage.  I will keep him on the same pressure settings for now as per his request.  I welcome him to follow-up with me every 2 years.  2.  Hypersomnia  Improving  3.  Elevated blood pressure reading  I will have the " patient's blood pressure readings repeated during this clinic visit. I strongly advised the patient to follow up with PCP in one week.     Patient verbalized understanding of these issues, agrees with the plan and all questions were answered today. Patient was given an opportuntity to voice any other symptoms or concerns not listed above. Patient did not have any other symptoms or concerns.      Leo Mckinley DO  Board Certified in Internal Medicine and Sleep Medicine  [facility].      (Note created with Dragon voice recognition and unintended spelling errors and word substitutions may occur)

## 2021-06-02 NOTE — TELEPHONE ENCOUNTER
RN cannot approve Refill Request    RN can NOT refill this medication PCP messaged that patient is overdue for Office Visit. Last office visit: 8/6/2018 Ryan Byrd MD Last Physical: 3/13/2018 Last MTM visit: Visit date not found Last visit same specialty: 8/6/2018 Ryan Byrd MD.  Next visit within 3 mo: Visit date not found  Next physical within 3 mo: Visit date not found      [facility] Connection Triage/Med Refill 10/13/2019    Requested Prescriptions   Pending Prescriptions Disp Refills     atorvastatin (LIPITOR) 40 MG tablet 90 tablet 1     Sig: Take 1 tablet (40 mg total) by mouth at bedtime.       Statins Refill Protocol (Hmg CoA Reductase Inhibitors) Failed - 10/11/2019  3:36 PM        Failed - PCP or prescribing provider visit in past 12 months      Last office visit with prescriber/PCP: 8/6/2018 Ryan Byrd MD OR same dept: Visit date not found OR same specialty: 8/6/2018 Ryan Byrd MD  Last physical: 3/13/2018 Last MTM visit: Visit date not found   Next visit within 3 mo: Visit date not found  Next physical within 3 mo: Visit date not found  Prescriber OR PCP: Ryan Byrd MD  Last diagnosis associated with med order: 1. Mixed hyperlipidemia  - atorvastatin (LIPITOR) 40 MG tablet; Take 1 tablet (40 mg total) by mouth at bedtime.  Dispense: 90 tablet; Refill: 1    If protocol passes may refill for 12 months if within 3 months of last provider visit (or a total of 15 months).

## 2021-06-03 VITALS — BODY MASS INDEX: 27.08 KG/M2 | HEIGHT: 74 IN | WEIGHT: 211 LBS

## 2021-06-03 VITALS
HEIGHT: 74 IN | BODY MASS INDEX: 28.11 KG/M2 | SYSTOLIC BLOOD PRESSURE: 158 MMHG | DIASTOLIC BLOOD PRESSURE: 89 MMHG | OXYGEN SATURATION: 96 % | HEART RATE: 57 BPM | WEIGHT: 219 LBS

## 2021-06-03 VITALS
DIASTOLIC BLOOD PRESSURE: 80 MMHG | HEIGHT: 74 IN | BODY MASS INDEX: 27.59 KG/M2 | WEIGHT: 215 LBS | OXYGEN SATURATION: 98 % | SYSTOLIC BLOOD PRESSURE: 136 MMHG | HEART RATE: 60 BPM

## 2021-06-03 VITALS — WEIGHT: 214 LBS | BODY MASS INDEX: 27.48 KG/M2

## 2021-06-03 VITALS — WEIGHT: 214.8 LBS | HEIGHT: 74 IN | BODY MASS INDEX: 27.57 KG/M2

## 2021-06-03 NOTE — PROGRESS NOTES
ASSESSMENT: Delmar Archuleta is a 69 y.o. male with past medical history significant for hyperlipidemia who presents today for new patient evaluation of a nearly 3-week history of right low back pain with radiation into the right lower extremity with associated weakness.  MRI lumbar spine shows multilevel spondylitic change most prominent at L4-5 where there is moderate spinal canal stenosis.  Pain follows both an L4 and L5 distribution.  He had an absent right patellar reflex on exam.  He also demonstrated slight weakness in the right ankle dorsiflexors and right EHL.    DIANE: 32  Who 5: 20    PLAN:  A shared decision making model was used.  The patient's values and choices were respected.  The following represents what was discussed and decided upon by the physician assistant and the patient.      1.  DIAGNOSTIC TESTS: I reviewed the report of the MRI lumbar spine (unable to pull up images through epic/NIL due to technical error).  No further diagnostic tests were ordered.    2.  PHYSICAL THERAPY:  Discussed the importance of core strengthening, ROM, stretching exercises with the patient and how each of these entities is important in decreasing pain.  Explained to the patient that the purpose of physical therapy is to teach the patient a home exercise program.  These exercises need to be performed every day in order to decrease pain and prevent future occurrences of pain.  Entered a referral for the patient begin physical therapy.    3.  MEDICATIONS:    -I provided a prescription for gabapentin 300 mg.  He was given the dosage titration chart.  He may increase his dose up to 600 mg 3 times daily.  We could potentially titrate this dose even higher, depending on his response.  - Patient is using oxycodone 10 mg twice daily.  This is managed by his primary care provider.  - I told the patient that he could try using Aleve as well.    4.  INTERVENTIONS: Due to the patient's weakness, I offered a right L4-5, L5-S1  transforaminal epidural steroid injection.  I chose this injection because his pain is more radicular in nature.  He denies limited walking and standing tolerance.  Pain is worse with lying down.  Patient would like to proceed with the injection and the order was placed.  I did tell the patient that when I am able to review his actual images I will do so.  If I change my mind about which type of injection order, we will call him and let him know.    5.  PATIENT EDUCATION: Patient is in agreement the above plan.  All questions were answered.  -I told the patient that it is standard of care to try nonsurgical treatments first.  I am optimistic that he will have improvement in his symptoms with nonsurgical treatments.  However, we will keep a close eye on his exam/symptoms.  If symptoms fail to improve with conservative treatment (especially weakness), I will refer him to spine surgery.    6.  FOLLOW-UP:   I will see the patient back in clinic for 2-week post procedure follow-up visit after his right L4-5, L5-S1 transforaminal epidural steroid injection.  If he has questions or concerns in the meantime, he should not hesitate to call.      SUBJECTIVE:  Delmar Archuleta  Is a 69 y.o. male who presents today in consultation at the request of Dr. Byrd for new patient evaluation of low back pain with radiation into the right lower extremity with associated weakness.  Patient reports that his pain began on October 30, 2019 (nearly 3 weeks ago).  Patient states that his pain came on suddenly during the night.  He denies any injury or change in activity the previous day to cause the pain.  It has been persisting since then.  He denies any previous history of significant back problems.  He does admit that he previously experienced burning pain in the bilateral anterior thighs with prolonged standing and walking, but it was not limiting his activity at all.  He has never had severe pain like he is having now.  The pain is  interfering with his ability to sleep.  MRI lumbar spine was obtained and he was referred to our clinic for further evaluation and treatment.    Patient complains of right-sided low back pain.  He complains of what he describes as a aching pain in the right lower back.  He then experiences a burning, throbbing pain that radiates down the anterior and lateral right thigh and extends into the anterior and lateral right shin, extending to the ankle.  It does not reach the foot.  Pain is worse with laying down.  Pain improves with sitting and applying heat.  Patient reports that he tried Vicodin and oxycodone 5 mg.  These did nothing for his pain.  He is now taking oxycodone 10 mg at bedtime.  He states that this only provides relief for about 1.5 to 2 hours of pain.  At that point the pain will wake him up.  He takes an additional oxycodone 10 mg during the night.  He denies any numbness or tingling down the leg.  He states that his right leg feels weak.  He states it gives way when he is walking.  He has had a couple of falls because of this.  He denies any left leg symptoms.  He denies any loss of bowel or bladder control.  Denies any recent fevers, chills, or sweats.    Patient has not had any treatments for his lower back.  He has not had physical therapy.  He does not go to a chiropractor.  He has never had any spine surgeries or spine injections.  As mentioned above, patient is using oxycodone 10 mg twice daily.  He is not taking any other pain relieving medications.    Current Outpatient Medications on File Prior to Visit   Medication Sig Dispense Refill     aspirin 81 MG EC tablet Take 81 mg by mouth daily.       atorvastatin (LIPITOR) 40 MG tablet Take 1 tablet (40 mg total) by mouth at bedtime. 90 tablet 1     HYDROcodone-acetaminophen 5-325 mg per tablet Take 1 tablet by mouth every 8 (eight) hours as needed (for acute pain). 18 tablet 0     oxyCODONE (ROXICODONE) 10 mg immediate release tablet Take 0.5  tablets (5 mg total) by mouth every 4 (four) hours as needed for pain. For acute sciatica pain pending MRI 30 tablet 0         No Known Allergies    Past Medical History:   Diagnosis Date     Colon polyps      History of arm fracture      Hyperlipidemia       Patient Active Problem List   Diagnosis     Benign Polyps Of The Large Intestine     Hyperlipidemia     Cervical spondylosis without myelopathy         Past Surgical History:   Procedure Laterality Date     FRACTURE SURGERY       NO PAST SURGERIES           Family History   Problem Relation Age of Onset     Coronary artery disease Father      Breast cancer Sister      Coronary artery disease Brother      Sleep apnea Brother        Social history: Patient is .  He is semi-retired.  He smokes 2 cigars/day.  He drinks 2 beers per day.  He denies illicit drug use.      ROS: Positive for muscle pain, muscle fatigue, sciatica, imbalance, falls, excessive tiredness.  Specifically negative for bowel/bladder dysfunction, fevers,chills, appetite changes, unexplained weight loss.   Otherwise 13 systems reviewed are negative.  Please see the patient's intake questionnaire from today for details.      OBJECTIVE:  PHYSICAL EXAMINATION:    CONSTITUTIONAL:  Vital signs as above.  No acute distress.  The patient is well nourished and well groomed.  PSYCHIATRIC:  The patient is awake, alert, oriented to person, place, time and answering questions appropriately with clear speech.    HEENT: Normocephalic, atraumatic.  Sclera clear.  Neck is supple.  SKIN:  Skin over the face, bilateral lower extremities, and posterior torso is clean, dry, intact without rashes.    GAIT:  Gait is mildly antalgic, favoring the right.  Patient has difficulty ambulate and on toes on the right (states he feels like his leg will give out).  Also has difficulty in relating on his heel on the right (unable to fully dorsiflex).  STANDING EXAMINATION: Mild tenderness palpation right lower lumbar  paraspinous muscles.  Lumbar flexion mildly restricted.  Lumbar extension within normal limits.  MUSCLE STRENGTH:  The patient has 4+/5 strength right EHL and right ankle dorsiflexors, otherwise 5/5 strength for the bilateral hip flexors, knee flexors/extensors, left ankle dorsiflexors, bilateral ankle plantar flexors, left great toe extensors.  NEUROLOGICAL: Reflexes are 2+ left and absent right patellar, 1+ bilateral Achilles.  Negative Babinski's bilaterally.  No ankle clonus bilaterally. Sensation to light touch is intact in the bilateral L4, L5, and S1 dermatomes.  VASCULAR:  2/4 dorsalis pedis pulses bilaterally.  Bilateral lower extremities are warm.  There is no   Pitting edema of the bilateral lower extremities.  ABDOMINAL:  Soft, non-distended, non-tender throughout all quadrants.  No pulsatile mass palpated in the left lower quadrant.  LYMPH NODES:  No palpable or tender inguinal lymph nodes.  MUSCULOSKELETAL:  Straight leg raise negative bilaterally.    RESULTS: I reviewed the MRI lumbar spine from [facility] dated November 16, 2019.  At L4-5 there is a symmetric disc bulge and osteophytes in combination with moderate facet arthropathy and mild thickening of the ligamentum flavum which results in moderate spinal canal stenosis and mild to moderate bilateral foraminal stenosis.  At L5-S1 there is mild spinal canal stenosis with moderate left and mild right foraminal stenosis.  Please see report for further details.

## 2021-06-03 NOTE — PROGRESS NOTES
[facility] Certification Request    November 27, 2019      Patient: Delmar Archuleta  MR Number: [id number]  YOB: 1950  Date of Visit: 11/27/2019      Dear Tanisha Mora PA-C    Thank you for this referral.   We are seeing Delmar Archuleta for Physical Therapy for back pain.    Medicare and/or Medicaid requires physician review and approval of the treatment plan. Please review the plan of care and verify that you agree with the therapy plan of care by co-signing this note.      Plan of Care  Authorization / Certification Start Date: 11/27/19  Authorization / Certification End Date: 02/05/20  Authorization / Certification Number of Visits: 10  Communication with: Referral Source  Patient Related Instruction: Nature of Condition;Treatment plan and rationale;Self Care instruction;Basis of treatment;Next steps;Expected outcome  Times per Week: 1  Number of Weeks: 10  Number of Visits: 10  Discharge Planning: self management of symptoms, denies falls   Therapeutic Exercise: ROM;Stretching;Strengthening  Neuromuscular Reeducation: kinesio tape;posture;balance/proprioception;core  Manual Therapy: soft tissue mobilization;myofascial release;joint mobilization  Modalities: traction  Equipment: theraband      Goals:  Pt. will demonstrate/verbalize independence in self-management of condition in : in other weeks  [facility] Self Management Progress Towards Goal Other: 10 weeks   Pt. will be independent with home exercise program in : 6 weeks  Pt. will report decreased intensity, frequency of : Pain;Comment  Comment:: in right leg in 6 weeks.     Pt will: report no fall or LOB due to knee buckling in 10 weeks.   Pt will: laying supine without pain in 10 weeks to allow for ease with sleeping.         If you have any questions or concerns, please don't hesitate to call.    Sincerely,      Denisse Berrios, PT        Physician recommendation:     ___ Follow therapist's recommendation        ___  "Modify therapy      *Physician co-signature indicates they certify the need for these services furnished within this plan and while under their care.    [facility]   [facility]-Pelvic Initial Evaluation    Patient Name: Delmar Archuleta \"Charles\"  Date of evaluation: 11/27/2019  Referral Diagnosis: Spinal stenosis of lumbar region without neurogenic claudication  Referring provider: Tanisha Mora PA-C  Visit Diagnosis:     ICD-10-CM    1. Acute right-sided low back pain with right-sided sciatica M54.41    2. Generalized muscle weakness M62.81    3. Falls frequently R29.6      Precautions:   Cervical spondylosis without myelopathy        Assessment:   Pt is a 69 y.o. year old male with right anterior thigh pain and right-sided low back pain that started on October 30th, 2019.  Patient has difficulty with laying down, sleeping, recent falls due to right knee giving out on him.  Findings are consistent with radicular symptoms consistent with femoral nerve.  Patient appears motivated to participate in Physical Therapy and present with a good Physical Therapy prognosis for resolution of activities limitations.     10/30/19:  Multilevel spondylotic change of the lumbar spine as described, most prominent at L4-L5 where there is moderate spinal canal stenosis.        Pt. is appropriate for skilled PT intervention as outlined in the Plan of Care (POC).  Pt. is a good candidate for skilled PT services to improve pain levels and function.  Plan of care and goals were established in collaboration with patient.     Goals:  Pt. will demonstrate/verbalize independence in self-management of condition in : in other weeks  [place] Self Management Progress Towards Goal Other: 10 weeks   Pt. will be independent with home exercise program in : 6 weeks  Pt. will report decreased intensity, frequency of : Pain;Comment  Comment:: in right leg in 6 weeks.     Pt will: report no fall or LOB due to knee buckling in 10 weeks.   Pt " will: laying supine without pain in 10 weeks to allow for ease with sleeping.       Patient goals: learn what I can do to help     Patient's expectations/goals are realistic.    Barriers to Learning or Achieving Goals:  No Barriers.       Plan / Patient Instructions:    Plan of Care:   Authorization / Certification Start Date: 11/27/19  Authorization / Certification End Date: 02/05/20  Authorization / Certification Number of Visits: 10  Communication with: Referral Source  Patient Related Instruction: Nature of Condition;Treatment plan and rationale;Self Care instruction;Basis of treatment;Next steps;Expected outcome  Times per Week: 1  Number of Weeks: 10  Number of Visits: 10  Discharge Planning: self management of symptoms, denies falls   Therapeutic Exercise: ROM;Stretching;Strengthening  Neuromuscular Reeducation: kinesio tape;posture;balance/proprioception;core  Manual Therapy: soft tissue mobilization;myofascial release;joint mobilization  Modalities: traction  Equipment: theraband      Plan for next visit: check balance, standing ex's, stairs, squats  Review current HEP     Subjective:         Social information:   Living Situation:single family home - 2 story    Occupation:retired   Work Status:NA   Equipment Available: SE cane - gives him more confidence.     History of Present Illness:    Delmar is a 69 y.o. male who presents to therapy today with complaints of right low back pain with right lower extremity radicular symptoms with weakness. Date of onset/duration of symptoms is 10/30/19. Onset was sudden during the night. Symptoms are intermittent and getting slightly better - knee does not give out as much but pain is about the same.  He reports  no  history of similar symptoms. He describes their previous level of function as not limited   Pt reports right knee gives out and has had 3 falls.  Knee gives out on him couple times a day.     Pt reports weakness in R leg and notices with ambulating and  "stairs.      19[facility]: Pain follows both an L4 and L5 distribution.  He had an absent right patellar reflex on exam.  He also demonstrated slight weakness in the right ankle dorsiflexors and right EHL.    Pain Ratin  Pain rating at best: 0  Pain rating at worst: 6  Pain description: burning sensation in R thigh     Achy pain in R low back, burning, throbbing pain that radiates down the anterior and lateral right thigh and extends into the anterior and lateral right shin, extending to the ankle.  It does not reach the foot.     Functional limitations are described as occurring with:   Stand 10-30 min  Sit: some pain   Walk 10-30 min   difficulty with changing sleeping postions -pt sleeps on side usually, has started sleep on back laying flat.   Worse with laying down - going to bed at night.  Can sleep for 2-3 hours.    Patient reports benefit from:  sit, stand    Is taking gabapentin and pain meds to sleep      Exercise: none    Objective:      Note: Items left blank indicates the item was not performed or not indicated at the time of the evaluation.    Patient Outcome Measures :    Modified Oswestry Low Back Pain Disablity Questionnaire  in %: 38     Scores range from 0-100%, where a score of 0% represents minimal pain and maximal function. The minimal clinically important difference is a score reduction of 12%.    Examination  1. Acute right-sided low back pain with right-sided sciatica     2. Generalized muscle weakness     3. Falls frequently       Involved side: Right  Posture Observation:      General sitting posture is  fair.    Lumbar ROM:    Date: 2019     *Indicate scale AROM AROM AROM   Lumbar Flexion 6.5 inches   Slight centeralization     Lumbar Extension WFL         Right Left Right Left Right Left   Lumbar Sidebending 1\" P 2\" lateral joint line  P        Lumbar Rotation WFL WFL       Thoracic Flexion      Thoracic Extension      Thoracic Sidebending         Thoracic Rotation     "       Lower Extremity Strength:     Date: 11/27/2019     LE strength/5 Right Left Right Left Right Left   Hip Flexion (L1-3) 4 4       Hip Extension (L5-S1)         Hip Abduction (L4-5)         Hip Adduction (L2-3)         Hip External Rotation         Hip Internal Rotation         Knee Extension (L3-4) 4-  shaky 4       Knee Flexion 4+  shaky 4+  Shaky        Ankle Dorsiflexion (L4-5) 5 5       Great Toe Extension (L5) 5 5        Ankle Plantar flexion (S1) 10 increased difficulty  10+       Abdominals        Sensation           Reflex Testing  Lumbar Dermatomes Right Left UE Reflexes Right Left   Iliac Crest and Groin (L1)   Biceps (C5-6)     Anterior Medial Thigh (L2) - - Brachioradialis (C5-6)     Anterior Thigh, Medial Epicondyle Femur (L3) - - Triceps (C7-8)     Lateral Thigh, Anterior Knee, Medial Leg/Malleolus (L4) - - Javid s test (SC compression)   Flick middle finger = thumb flexion     Lateral Leg, Dorsal Foot (L5) - - LE Reflexes     Lateral Foot (S1) - - Patellar (L3-4) 0 2+   Posterior Leg (S2) - - Achilles (S1-2) 0 0   Other:   Babinski Response       Palpation: tension in R HS with pain, pain with palpation to lumbosacral junction     11x - 30 sec sit to stand - favoring L LE     Lumbar Special Tests:     Lumbar Special Tests Right Left SI Tests Right  Left   Quadrant test   SI Compression- side lying      Straight leg raise -  50  Reports more tension on R -   50 SI Distraction - supine     Distraction    POSH Test - thigh thrust      Slump - unable to extend knee  - unable to extend knee  Sacral Thrust     Sit-up test  Gaenslen's test (hip flex and hip ext - press)      Prone instability test  FADIR     Trunk extensor endurance test  Femoral Nerve Tension (prone)       Pubic shotgun  Resisted Abduction in supine       FADER       Repeated Motion Testing:  Does not centralize  Does not peripheralize    Passive Mobility - Joint Integrity: Spring Testing  pain in lumbosacral junction     LE  Screen:  Not tested.    Exercises:  Exercise #1: prone knee bends: femoral nerve glide x10   Comment #1: bridge 2x15  Exercise #2: clams 2x15   Comment #2: sit<>stand x10 from elevated surface - left LE in front   Exercise #3: NG: sciatic in supine x15       Treatment Today     TREATMENT MINUTES COMMENTS   Evaluation 27 -lumbar spine  -educated on POC, diagnosis, relevant anatomy, basis for treatment and HEP   Self-care/ Home management     Manual therapy     Neuromuscular Re-education     Therapeutic Activity     Therapeutic Exercises 25 -see exercise flow sheet     Gait training     Modality__________________                Total 52    Blank areas are intentional and mean the treatment did not include these items.     PT Evaluation Code: (Please list factors)  Patient History/Comorbidities: see above  Examination: see above  Clinical Presentation: stable  Clinical Decision Making: low     Patient History/  Comorbidities Examination  (body structures and functions, activity limitations, and/or participation restrictions) Clinical Presentation Clinical Decision Making (Complexity)   No documented Comorbidities or personal factors 1-2 Elements Stable and/or uncomplicated Low   1-2 documented comorbidities or personal factor 3 Elements Evolving clinical presentation with changing characteristics Moderate   3-4 documented comorbidities or personal factors 4 or more Unstable and unpredictable High                Denisse Berrios, PT, DPT  11/27/2019  6:51 AM

## 2021-06-03 NOTE — TELEPHONE ENCOUNTER
Call to pt with provider's info. He stated understanding and expressed appreciation for the call.

## 2021-06-03 NOTE — PATIENT INSTRUCTIONS - HE
Gabapentin 300mg Dosing Chart    DATE  MORNING AFTERNOON BEDTIME    Day 1 0 0 1    Day 2 0 0 1    Day 3 0 0 1    Day 4 1 0 1    Day 5 1 0 1    Day 6 1 0 1    Day 7 1 1 1    Day 8 1 1 1    Day 9 1 1 1    Day 10 1 1 2    Day 11 1 1 2    Day 12 1 1 2    Day 13 2 1 2    Day 14 2 1 2    Day 15 2 1 2    Day 16 2 2 2    Day 17 2 2 2    Day 18 2 2 2     Continue medication, taking 2 capsules three times daily    Please call if you have any questions regarding how to take your medication  Clinic Phone # [phone]

## 2021-06-03 NOTE — TELEPHONE ENCOUNTER
----- Message from Tanisha Mora PA-C sent at 11/19/2019 10:22 AM CST -----  Regarding: reviewed MRI  Please call patient let him know that I reviewed the images from his MRI lumbar spine.  No changes to he plan.  He should proceed with a right L4-5 and L5-S1 transforaminal epidural steroid injection, physical therapy, and gabapentin.

## 2021-06-04 NOTE — PROGRESS NOTES
[facility] Daily Progress     Patient Name: Delmar Archuleta  Date: 2019  Visit #: 3/10 (end date 20 - Medicare)   PTA visit #:  1  Referral Diagnosis: Spinal stenosis of lumbar region without neurogenic claudication  Referring provider: Tanisha Mora PA-C **  Visit Diagnosis:     ICD-10-CM    1. Acute right-sided low back pain with right-sided sciatica M54.41    2. Generalized muscle weakness M62.81    3. Falls frequently R29.6          Assessment:   From Evaluation: Pt is a 69 y.o. year old male with right anterior thigh pain and right-sided low back pain that started on 2019.  Patient has difficulty with laying down, sleeping, recent falls due to right knee giving out on him.  Findings are consistent with radicular symptoms consistent with femoral nerve.  Patient appears motivated to participate in Physical Therapy and present with a good Physical Therapy prognosis for resolution of activities limitations.     Cues for HEP/posture needed  Complaint with HEP  Pt would benefit with continuing skilled physical therapy treatments    Goal Status: Ongoing  Pt. will demonstrate/verbalize independence in self-management of condition in : in other weeks  [place] Self Management Progress Towards Goal Other: 10 weeks   Pt. will be independent with home exercise program in : 6 weeks  Pt. will report decreased intensity, frequency of : Pain;Comment  Comment:: in right leg in 6 weeks.     Pt will: report no fall or LOB due to knee buckling in 10 weeks.   Pt will: laying supine without pain in 10 weeks to allow for ease with sleeping.       Plan / Patient Education:     Continue POC  Review TA marches (progress if able), isometric hip flexion, step-ups, and TKE  Trial stairs    Subjective:     Pt notes feeling better since injection  Pain Ratin/10  Notes knees are 'giving out' less than before - switch sided   Last fall was 4 weeks ago  Exercises 1x/day  Reports sleeping better  since injection      Objective:   Reviewed HEP/posture cues needed  Added ex per flow sheet  Exercises:  Exercise #1: prone knee bends: femoral nerve glide x10   Comment #1: bridge 2x15  Exercise #2: clams 2x15   Comment #2: sit<>stand x10 from elevated surface - left LE in front   Exercise #3: NG: sciatic in supine x15   Comment #3: standing hip abduction, extension, partial squats, and heel/toe raises 10x shirley  Exercise #4: TA marches x10  Comment #4: Supine hip flexion isometric - 10 x 5sec hold  Exercise #5: TKE with GTB x15 each leg  Comment #5: Step-up x10 each leg    Stairs:  Step-over-step with hand-rail weakness noted during terminal knee extension on R leg during ascension of stairs.  Step over step with hand rail while descending with mild weakness noted during R terminal knee extension.    Treatment Today   12/12/2019    TREATMENT MINUTES COMMENTS   Evaluation     Self-care/ Home management     Manual therapy     Neuromuscular Re-education     Therapeutic Activity     Therapeutic Exercises 28 Exercises per flow sheet above   Gait training     Modality__________________                Total 28    Blank areas are intentional and mean the treatment did not include these items.     Nestor John, SPT  I attest that I was present in the room, making skilled assessments and directing the service provided today.  I was responsible for the assessment and treatment of the patient.  During this time, I was not engaged in treating another patient or doing other tasks.        Denisse Berrios  12/12/2019

## 2021-06-04 NOTE — TELEPHONE ENCOUNTER
Pharmacy was contacted and verified patient still has two additional refills. Cancel refill request.

## 2021-06-04 NOTE — PROGRESS NOTES
Assessment:   Delmar Archuleta is a 69 y.o. y.o. male with past medical history significant for hyperlipidemia who presents today for follow-up regarding right low back pain with radiation into the right lower extremity associated weakness.  MRI lumbar spine shows multilevel spondylitic change most prominent at L4-5 where there is moderate spinal canal stenosis.  Patient status post a right L4-5, L5-S1 transforaminal epidural steroid injection December 5, 2019 which has provided 98% relief of his pain.  Patient continues have slight weakness when attempting to ambulate on the heel on the right, but otherwise strength has improved.       Plan:     A shared decision making plan was used.  The patient's values and choices were respected.  The following represents what was discussed and decided upon by the physician assistant and the patient.      1.  DIAGNOSTIC TESTS: I reviewed the MRI lumbar spine.  No further diagnostic tests were ordered.    2.  PHYSICAL THERAPY: Patient is currently in physical therapy.  He has had 4 sessions so far.  He will continue with physical therapy and the home exercises.    3.  MEDICATIONS:    -Patient is currently taking gabapentin 6 and a milligrams 3 times daily.  He admits that he has missed doses but has not felt any significant worsening in pain.  I recommended that he wean off this medication by decreasing his dose by 1 capsule every 2 to 3 days.  As long as pain does not worsen, he can wean off the medication completely.  If pain does worsen at a lower dose he should continue  using the lowest possible dose for another 4 weeks and then attempt to wean off again.  -Patient can continue using Aleve as needed.    4.  INTERVENTIONS: No further interventions were ordered.  If pain were to return, recommend repeating a right L4-5, L5-S1 transforaminal epidural steroid injection.    5.  PATIENT EDUCATION: Patient is in agreement the above plan.  All questions were answered.    6.   FOLLOW-UP: Patient to follow-up as needed.  If he has questions or concerns, he should not hesitate to call.    Subjective:     Delmar Archuleta is a 69 y.o. male who presents today for follow-up regarding right low back pain with radiation into the right lower extremity with associated weakness.  Patient status post a right L4-5, L5-S1 transforaminal epidural steroid injection December 5, 2019.  Patient reports 98% improvement in his pain.    Patient complains of only mild residual right low back pain.  He states that he has intermittent burning discomfort in the right anterior thigh.  He denies any additional pain down the leg.  He states that he still feels some weakness in the right leg but he feels that that is improving.  He rates his pain today as a 0 or 1 out of 10.  At its worst it is a 2 out of 10.  At its best it is a 0-10.  Pain is aggravated with lifting and walking.  Pain is alleviated with sitting.  Patient states that he has been doing some more physical work with cabinets which has aggravated his back slightly.    Patient is currently in physical therapy.  He has had 4 sessions of far.  He is doing home exercises.  He uses gabapentin 610 mg 3 times daily.  He wonders if he can wean off this medication.  He uses Aleve as needed.    Past medical history is reviewed and is unchanged.    Review of Systems:  Positive for numbness/tingling (the burning discomfort in the anterior thigh), weakness (improving).  Negative for loss of bowel/bladder control, inability to urinate, headache, pain much worse at night, trip/stumble/falls, difficulty swallowing, difficulty with hand skills, fevers, unintentional weight loss.     Objective:   CONSTITUTIONAL:  Vital signs as above.  No acute distress.  The patient is well nourished and well groomed.    PSYCHIATRIC:  The patient is awake, alert, oriented to person, place and time.  The patient is answering questions appropriately with clear speech.  Normal  affect.  HEENT: Normocephalic, atraumatic.  Sclera clear.    SKIN:  Skin over the face, posterior torso, bilateral upper and lower extremities is clean, dry, intact without rashes.  MUSCULOSKELETAL:  Gait is non-antalgic.  Patient is able to toe walk bilateral without difficulty.  Patient demonstrates slight difficulty heel walking on the right.  No tenderness over the bilateral lower lumbar paraspinal muscles.      The patient has 5/5 strength for the bilateral hip flexors, knee flexors/extensors, ankle dorsiflexors/plantar flexors, ankle evertors/invertors.    NEUROLOGICAL: 2+ patellar, 1+ Achilles reflexes which are symmetric bilaterally.  No ankle clonus bilaterally.  Sensation to light touch is intact in the bilateral L4, L5, and S1 dermatomes.       RESULTS:  I reviewed the MRI lumbar spine from [facility] dated November 16, 2019.  At L4-5 there is a symmetric disc bulge and osteophytes in combination with moderate facet arthropathy and mild thickening of the ligamentum flavum which results in moderate spinal canal stenosis and mild to moderate bilateral foraminal stenosis.  At L5-S1 there is mild spinal canal stenosis with moderate left and mild right foraminal stenosis.  Please see report for further details.

## 2021-06-04 NOTE — PATIENT INSTRUCTIONS - HE
Follow-up visit with Tanisha Mora in 2 weeks to discuss injection outcome and determine care plan going forward.       DISCHARGE INSTRUCTIONS    During office hours (8:00 a.m.- 4:00 p.m.) questions or concerns may be answered  by calling [facility] Navigation Nurses at  [phone].  Messages received after hours will be returned the following business day.      In the case of an emergency, please dial 911 or seek assistance at the nearest Emergency Room/Urgent Care facility.     All Patients:    ? You may experience an increase in your symptoms for the first 2 days (It may take anywhere between 2 days- 2 weeks for the steroid to have maximum effect).    ? You may use ice on the injection site, as frequently as 20 minutes each hour if needed.    ? You may take your pain medicine.    ? You may continue taking your regular medication after your injection. If you have had a Medial Branch Block you may resume pain medication once your pain diary is completed.    ? You may shower. No swimming, tub bath or hot tub for 48 hours.  You may remove your bandaid/bandage as soon as you are home.    ? You may resume light activities, as tolerated.    ? Resume your usual diet as tolerated.    ? It is strongly advised that you do not drive for 1-3 hours post injection.    ? If you have had oral sedation:  Do not drive for 8 hours post injection.      ? If you have had IV sedation:  Do not drive for 24 hours post injection.  Do not operate hazardous machinery or make important personal/business decisions for 24 hours.      POSSIBLE STEROID SIDE EFFECTS (If steroid/cortisone was used for your procedure)    -If you experience these symptoms, it should only last for a short period      Swelling of the legs                Skin redness (flushing)       Mouth (oral) irritation     Blood sugar (glucose) levels              Sweats                      Mood changes    Headache    Sleeplessness         POSSIBLE PROCEDURE SIDE EFFECTS  -Call  the Spine Center if you are concerned    Increased Pain             Increased numbness/tingling        Nausea/Vomiting            Bruising/bleeding at site        Redness or swelling                                                Difficulty walking        Weakness             Fever greater than 100.5    *In the event of a severe headache after an epidural steroid injection that is relieved by lying down, please call the [facility] to speak with a clinical staff member*

## 2021-06-04 NOTE — PROGRESS NOTES
[facility] Daily Progress     Patient Name: Delmar Archuleta  Date: 2019  Visit #: 5/10 (end date 20 - Medicare)   PTA visit #:  3  Referral Diagnosis: Spinal stenosis of lumbar region without neurogenic claudication  Referring provider: Tanisha Mora PA-C **  Visit Diagnosis:     ICD-10-CM    1. Acute right-sided low back pain with right-sided sciatica M54.41    2. Generalized muscle weakness M62.81    3. Falls frequently R29.6          Assessment:   Reports feeling much better since injection but right leg still gives out occasionally   Right leg weakness hyperextends  Right knee when walking, and on stairs  Cues for HEP/posture needed  Complaint with HEP  Pt would benefit with continuing skilled physical therapy treatments    Goal Status: progressing towards  Pt. will demonstrate/verbalize independence in self-management of condition in : in other weeks  [place] Self Management Progress Towards Goal Other: 10 weeks   Pt. will be independent with home exercise program in : 6 weeks  Pt. will report decreased intensity, frequency of : Pain;Comment  Comment:: in right leg in 6 weeks.     Pt will: report no fall or LOB due to knee buckling in 10 weeks.   Pt will: laying supine without pain in 10 weeks to allow for ease with sleeping.       Plan / Patient Education:     Continue POC  Continue strengthening exercises  Trial stairs    Subjective:     Pt notes feeling better since injection  Pain Ratin-2/10 low back pain intermittent  Decreased LOB with knee giving out  No falls since last visit   Exercises 1x/day   Reports sleeping better since injection      Objective:   Pt reports walking is better.  Pt right leg weakness hyper extends knee with walking and stair climbing  Sleeping is better   Exercises:  Exercise #1: prone knee bends: femoral nerve glide x10   Comment #1: bridge 10x with ball squeeze  Exercise #2: clams 10x with OTB  Comment #2: sit<>stand x10 from elevated surface -  left LE in front   Exercise #3: NG: sciatic in supine x15   Comment #3: standing hip abduction, extension, partial squats, and heel/toe raises 10x shirley  Exercise #4: TA marches x10  Comment #4: Supine hip flexion isometric - 10 x 5sec hold  Exercise #5: TKE with GTB 15x cues needed  Comment #5: step ups, step downs, side step 10x shirley  Exercise #6: prone hip ext 10x shirley  Comment #6: knee flexion seated with OTB  Exercise #7: side stepping with OTB 12 ftx 8            Treatment Today   12/24/2019    TREATMENT MINUTES COMMENTS   Evaluation     Self-care/ Home management     Manual therapy     Neuromuscular Re-education     Therapeutic Activity     Therapeutic Exercises 28 Exercises per flow sheet above   Gait training     Modality__________________                Total 28    Blank areas are intentional and mean the treatment did not include these items.             Jose Manuel Brown  12/24/2019

## 2021-06-04 NOTE — PROGRESS NOTES
[facility] Daily Progress     Patient Name: Delmar Archuleta  Date: 2019  Visit #: 2/10  PTA visit #:  1  Referral Diagnosis: Spinal stenosis of lumbar region without neurogenic claudication  Referring provider: Tanisha Mora PA-C  Visit Diagnosis:     ICD-10-CM    1. Acute right-sided low back pain with right-sided sciatica M54.41    2. Generalized muscle weakness M62.81    3. Falls frequently R29.6          Assessment:     Cues for HEP/posture needed  Complaint with HEP  Pt would benefit with continuing skilled physical therapy treatments    Goal Status: Ongoing  Pt. will demonstrate/verbalize independence in self-management of condition in : in other weeks  [place] Self Management Progress Towards Goal Other: 10 weeks   Pt. will be independent with home exercise program in : 6 weeks  Pt. will report decreased intensity, frequency of : Pain;Comment  Comment:: in right leg in 6 weeks.     Pt will: report no fall or LOB due to knee buckling in 10 weeks.   Pt will: laying supine without pain in 10 weeks to allow for ease with sleeping.       Plan / Patient Education:     Continue POC  Review standing ex, progress ex as able    Subjective:     Pain Ratin/10 lower back /leg pain right side  States he hasn't fallen past three weeks  Pt states he is scheduled for injections today  Exercises 1-2x/day  Walking right knee gives out   Pain medication 3x/day  Sleeping is better       Objective:   Reviewed HEP/posture cues needed  Added ex per flow sheet  Exercises:  Exercise #1: prone knee bends: femoral nerve glide x10   Comment #1: bridge 2x15  Exercise #2: clams 2x15   Comment #2: sit<>stand x10 from elevated surface - left LE in front   Exercise #3: NG: sciatic in supine x15   Comment #3: standing hip abduction, extension, partial squats, and heel/toe raises 10x shirley        Treatment Today   2019    TREATMENT MINUTES COMMENTS   Evaluation     Self-care/ Home management     Manual therapy      Neuromuscular Re-education     Therapeutic Activity     Therapeutic Exercises 25 Ex/posture reviewed   Gait training     Modality__________________                Total 25    Blank areas are intentional and mean the treatment did not include these items.       Jose Manuel Brown  12/5/2019

## 2021-06-04 NOTE — PROGRESS NOTES
[facility] Daily Progress     Patient Name: Delmar Archuleta  Date: 2019  Visit #: 4/10 (end date 20 - Medicare)   PTA visit #:  2  Referral Diagnosis: Spinal stenosis of lumbar region without neurogenic claudication  Referring provider: Tanisha Mora PA-C **  Visit Diagnosis:     ICD-10-CM    1. Acute right-sided low back pain with right-sided sciatica M54.41    2. Generalized muscle weakness M62.81    3. Falls frequently R29.6          Assessment:   Reports feeling much better since injection but right leg still gives out occasionally   Right leg weakness hyperextends  Right knee when walking, and on stairs  Cues for HEP/posture needed  Complaint with HEP  Pt would benefit with continuing skilled physical therapy treatments    Goal Status: progressing towards  Pt. will demonstrate/verbalize independence in self-management of condition in : in other weeks  [place] Self Management Progress Towards Goal Other: 10 weeks   Pt. will be independent with home exercise program in : 6 weeks  Pt. will report decreased intensity, frequency of : Pain;Comment  Comment:: in right leg in 6 weeks.     Pt will: report no fall or LOB due to knee buckling in 10 weeks.   Pt will: laying supine without pain in 10 weeks to allow for ease with sleeping.       Plan / Patient Education:     Continue POC  Continue strengthening exercises  Trial stairs    Subjective:     Pt notes feeling better since injection  Pain Ratin-2/10 low back pain from lifting this morning  Notes knees are 'giving out' less than before - switch sided   No falls since last visit   Exercises 1x/day haven't done new exercises yet  Reports sleeping better since injection      Objective:   Pt reports walking is better.  Pt right leg weakness hyper extends knee with walking and stair climbing  Exercises:  Exercise #1: prone knee bends: femoral nerve glide x10   Comment #1: bridge 2x15  Exercise #2: clams 2x15   Comment #2: sit<>stand  x10 from elevated surface - left LE in front   Exercise #3: NG: sciatic in supine x15   Comment #3: standing hip abduction, extension, partial squats, and heel/toe raises 10x shirley  Exercise #4: TA marches x10  Comment #4: Supine hip flexion isometric - 10 x 5sec hold  Exercise #5: TKE with GTB 15x cues needed  Comment #5: step ups, step downs, side step 10x shirley        Treatment Today   12/19/2019    TREATMENT MINUTES COMMENTS   Evaluation     Self-care/ Home management     Manual therapy     Neuromuscular Re-education     Therapeutic Activity     Therapeutic Exercises 28 Exercises per flow sheet above   Gait training     Modality__________________                Total 28    Blank areas are intentional and mean the treatment did not include these items.             Jose Manuel Brown  12/19/2019

## 2021-06-05 NOTE — PROGRESS NOTES
[facility] Daily Progress     Patient Name: Delmar Archuleta  Date: 1/9/2020  Visit #: 7/10 (end date 2/5/20 - Medicare)   PTA visit #:  5  Referral Diagnosis: Spinal stenosis of lumbar region without neurogenic claudication  Referring provider: Tanisha Mora PA-C **  Visit Diagnosis:     ICD-10-CM    1. Acute right-sided low back pain with right-sided sciatica M54.41    2. Generalized muscle weakness M62.81    3. Falls frequently R29.6          Assessment:   Pt reports 99% improvement since starting PT  Decreased buckling in knee haven't noticed lately  Intermittent leg discomfort  30% improvement in Oswestry score  Independent with HEP      Goal Status: met  Pt. will demonstrate/verbalize independence in self-management of condition in : Met  [place] Self Management Progress Towards Goal Other: 10 weeks   Pt. will be independent with home exercise program in : Met  Pt. will report decreased intensity, frequency of : Met  Comment:: intermittent pain pain and decreased pain noted    Pt will: report no fall or LOB due to knee buckling in 10 weeks.   Pt will: sleeping is good can lie on his back without pain      Plan / Patient Education:   Follow up MD  No further PT scheduled  Pt will call if further visits needed   Intermittent leg discomfort  Ex every other day or so    Subjective:     Pt reports 99% improvement  sleeping is good can lie on his back without pain  Walking is good , stairs are better going up reciprocally  Reports knee buckling hasn't happen in past week or so      Objective:   30% improvement in Oswestry score   Independent with HEP  Climb stairs reciprocally up/down  Sit to stand 13x/30 sec  SLS 5 sec shirley  Exercises:  Exercise #1: prone knee bends: femoral nerve glide x10   Comment #1: bridge 10x with ball squeeze  Exercise #2: clams 10x with OTB  Comment #2: sit<>stand x10 from elevated surface  13x/30 sec  Exercise #3: NG: sciatic in supine x15   Comment #3: standing hip  abduction, extension, partial squats, and heel/toe raises 10x shirley  Exercise #4: TA marches x10  Comment #4: Supine hip flexion isometric - 10 x 5sec hold  Exercise #5: TKE with GTB 15x cues needed  Comment #5: step ups, side step ups, step downs 10x shirley  Exercise #6: prone hip ext 10x shirley  Comment #6: knee flexion seated with OTB  Exercise #7: side stepping with OTB 12 ftx 8  Comment #7: tandem  corner 30 sec 2x shirley  Exercise #8: stationary bike 5 mins warm up          Treatment Today   1/9/2020    TREATMENT MINUTES COMMENTS   Evaluation     Self-care/ Home management     Manual therapy     Neuromuscular Re-education     Therapeutic Activity     Therapeutic Exercises 28 Exercises per flow sheet above   Gait training     Modality__________________                Total 28    Blank areas are intentional and mean the treatment did not include these items.             Jose Manuel Brown  1/9/2020

## 2021-06-06 NOTE — PROGRESS NOTES
[facility] Daily Progress     Patient Name: Delmar Archuleta  Date: 2/13/2020  Visit #: 6/10 (end date 2/5/20 - Medicare)   PTA visit #:  4  Referral Diagnosis: Spinal stenosis of lumbar region without neurogenic claudication  Referring provider: Tanisha Mora PA-C **  Visit Diagnosis:     ICD-10-CM    1. Acute right-sided low back pain with right-sided sciatica M54.41    2. Generalized muscle weakness M62.81    3. Falls frequently R29.6          Assessment:   Pt reports 99% improvement since starting PT  Decreased buckling in knee haven't noticed lately  Intermittent leg discomfort  30% improvement in Oswestry score  Independent with HEP      Goal Status: met  Pt. will demonstrate/verbalize independence in self-management of condition in : Met  [place] Self Management Progress Towards Goal Other: 10 weeks   Pt. will be independent with home exercise program in : Met  Pt. will report decreased intensity, frequency of : Met  Comment:: intermittent pain pain and decreased pain noted    Pt will: report no fall or LOB due to knee buckling in 10 weeks.   Pt will: sleeping is good can lie on his back without pain      Plan / Patient Education:   Follow up MD  No further PT scheduled  Pt will call if further visits needed   Intermittent leg discomfort  Ex every other day or so    Subjective:     Pt reports 99% improvement  sleeping is good can lie on his back without pain  Walking is good , stairs are better going up reciprocally  Reports knee buckling hasn't happen in past week or so      Objective:   30% improvement in Oswestry score   Independent with HEP  Climb stairs reciprocally up/down  Sit to stand 13x/30 sec  SLS 5 sec shirley  Exercises:  Exercise #1: prone knee bends: femoral nerve glide x10   Comment #1: bridge 10x with ball squeeze  Exercise #2: clams 10x with OTB  Comment #2: sit<>stand x10 from elevated surface  13x/30 sec  Exercise #3: NG: sciatic in supine x15   Comment #3: standing hip  abduction, extension, partial squats, and heel/toe raises 10x shirley  Exercise #4: TA marches x10  Comment #4: Supine hip flexion isometric - 10 x 5sec hold  Exercise #5: TKE with GTB 15x cues needed  Comment #5: step ups, side step ups, step downs 10x shirley  Exercise #6: prone hip ext 10x shirley  Comment #6: knee flexion seated with OTB  Exercise #7: side stepping with OTB 12 ftx 8  Comment #7: tandem  corner 30 sec 2x shirley  Exercise #8: stationary bike 5 mins warm up          Treatment Today   2/13/2020    TREATMENT MINUTES COMMENTS   Evaluation     Self-care/ Home management     Manual therapy     Neuromuscular Re-education     Therapeutic Activity     Therapeutic Exercises 28 Exercises per flow sheet above   Gait training     Modality__________________                Total 28    Blank areas are intentional and mean the treatment did not include these items.             Denisse Berrios, PT  2/13/2020  [facility] Discharge Summary  Patient Name: Delmar Archuleta  Date: 2/13/2020  Referral Diagnosis: Spinal stenosis of lumbar region without neurogenic claudication  Referring provider: Tanisha Mora PA-C **  Visit Diagnosis:   1. Acute right-sided low back pain with right-sided sciatica     2. Generalized muscle weakness     3. Falls frequently         Goals: MET   Pt. will demonstrate/verbalize independence in self-management of condition in : Met  [place] Self Management Progress Towards Goal Other: 10 weeks   Pt. will be independent with home exercise program in : Met  Pt. will report decreased intensity, frequency of : Met  Comment:: intermittent pain pain and decreased pain noted    Pt will: report no fall or LOB due to knee buckling in 10 weeks.   Pt will: sleeping is good can lie on his back without pain      Patient was seen for 6 visits from 11/27/19 to 1/9/20 with 0 missed appointments.  The patient met goals and has demonstrated understanding of and independence in the home program  for self-care, and progression to next steps.  He will initiate contact if questions or concerns arise.    Therapy will be discontinued at this time.  The patient will need a new referral to resume.    Thank you for your referral.  Denisse Berrios, PT, DPT   2/13/2020  8:40 AM

## 2021-06-07 NOTE — TELEPHONE ENCOUNTER
Refill Approved    Rx renewed per Medication Renewal Policy. Medication was last renewed on 10/14/19.    Nikky Erwin, [facility] Triage/Med Refill 4/9/2020     Requested Prescriptions   Pending Prescriptions Disp Refills     atorvastatin (LIPITOR) 40 MG tablet [Pharmacy Med Name: ATORVASTATIN 40 MG TABLET] 90 tablet 1     Sig: TAKE 1 TABLET BY MOUTH EVERYDAY AT BEDTIME       Statins Refill Protocol (Hmg CoA Reductase Inhibitors) Passed - 4/8/2020  2:51 PM        Passed - PCP or prescribing provider visit in past 12 months      Last office visit with prescriber/PCP: 10/24/2019 Ryan Byrd MD OR same dept: 10/24/2019 Ryan Byrd MD OR same specialty: 10/24/2019 Ryan Byrd MD  Last physical: 3/13/2018 Last MTM visit: Visit date not found   Next visit within 3 mo: Visit date not found  Next physical within 3 mo: Visit date not found  Prescriber OR PCP: Ryan Byrd MD  Last diagnosis associated with med order: 1. Mixed hyperlipidemia  - atorvastatin (LIPITOR) 40 MG tablet [Pharmacy Med Name: ATORVASTATIN 40 MG TABLET]; TAKE 1 TABLET BY MOUTH EVERYDAY AT BEDTIME  Dispense: 90 tablet; Refill: 1    If protocol passes may refill for 12 months if within 3 months of last provider visit (or a total of 15 months).

## 2021-06-13 NOTE — TELEPHONE ENCOUNTER
RN cannot approve Refill Request    RN can NOT refill this medication Protocol failed and NO refill given. Last office visit: 10/24/2019 Ryan Byrd MD Last Physical: 3/13/2018 Last MTM visit: Visit date not found Last visit same specialty: 10/24/2019 Ryan Byrd MD.  Next visit within 3 mo: Visit date not found  Next physical within 3 mo: Visit date not found      Nikky Erwin, [facility] Triage/Med Refill 11/17/2020    Requested Prescriptions   Pending Prescriptions Disp Refills     atorvastatin (LIPITOR) 40 MG tablet [Pharmacy Med Name: ATORVASTATIN 40 MG TABLET] 90 tablet 0     Sig: TAKE 1 TABLET BY MOUTH EVERYDAY AT BEDTIME       Statins Refill Protocol (Hmg CoA Reductase Inhibitors) Failed - 11/16/2020 12:10 AM        Failed - PCP or prescribing provider visit in past 12 months      Last office visit with prescriber/PCP: 10/24/2019 Ryan Byrd MD OR same dept: Visit date not found OR same specialty: 10/24/2019 Ryan Byrd MD  Last physical: 3/13/2018 Last MTM visit: Visit date not found   Next visit within 3 mo: Visit date not found  Next physical within 3 mo: Visit date not found  Prescriber OR PCP: Ryan Byrd MD  Last diagnosis associated with med order: 1. Mixed hyperlipidemia  - atorvastatin (LIPITOR) 40 MG tablet [Pharmacy Med Name: ATORVASTATIN 40 MG TABLET]; TAKE 1 TABLET BY MOUTH EVERYDAY AT BEDTIME  Dispense: 90 tablet; Refill: 0    If protocol passes may refill for 12 months if within 3 months of last provider visit (or a total of 15 months).

## 2021-06-14 NOTE — TELEPHONE ENCOUNTER
Patient states he received e-mail this morning that he tested positive for Covid 19; was tested at [facility] and that he received no information on care or isolation guidelines.    If you have symptoms: stay home and away from others (self-isolate) until:    You've had no fever--and no medicine that reduces fever--for 1 full day (24 hours). And      Your other symptoms have gotten better. For example, your cough or breathing has improved. And     At least 10 days have passed since your symptoms started. (If you ve been told by a doctor that you have a weak immune system, wait 20 days.)     If you don't have symptoms: Stay home and away from others (self-isolate) until at least 10 days have passed since your first positive COVID-19 test. (Date test collected).    During this time:    Stay in your own room, including for meals. Use your own bathroom if you can.    Stay away from others in your home. No hugging, kissing or shaking hands. No visitors.     Don't go to work, school or anywhere else.     Clean  high touch  surfaces often (doorknobs, counters, handles, etc.). Use a household cleaning spray or wipes. You'll find a full list on the EPA website at www.epa.gov/pesticide-registration/list-n-disinfectants-use-against-sars-cov-2.     Cover your mouth and nose with a mask, tissue or other face covering to avoid spreading germs.    Wash your hands and face often with soap and water.    Caregivers in these groups are at risk for severe illness due to COVID-19:  o People 65 years and older  o People who live in a nursing home or long-term care facility  o People with chronic disease (lung, heart, cancer, diabetes, kidney, liver, immunologic)  o People who have a weakened immune system, including those who:  - Are in cancer treatment  - Take medicine that weakens the immune system, such as corticosteroids  - Had a bone marrow or organ transplant  - Have an immune deficiency  - Have poorly controlled HIV or  AIDS  - Are obese (body mass index of 40 or higher)  - Smoke regularly    Caregivers should wear gloves while washing dishes, handling laundry and cleaning bedrooms and bathrooms.    Wash and dry laundry with special caution. Don't shake dirty laundry, and use the warmest water setting you can.    If you have a weakened immune system, ask your doctor about other actions you should take.    For more tips, go to www.cdc.gov/coronavirus/2019-ncov/downloads/10Things.pdf.    You should not go back to work until you meet the guidelines above for ending your home isolation. You don't need to be retested for COVID-19 before going back to work--studies show that you won't spread the virus if it's been at least 10 days since your symptoms started (or 20 days, if you have a weak immune system).    How can I take care of myself?    1. Get lots of rest. Drink extra fluids (unless a doctor has told you not to).    2. Take Tylenol (acetaminophen) for fever or pain. If you have liver or kidney problems, ask your family doctor if it's okay to take Tylenol.     Take either:     650 mg (two 325 mg pills) every 4 to 6 hours, or     1,000 mg (two 500 mg pills) every 8 hours as needed.     Note: Don't take more than 3,000 mg in one day. Acetaminophen is found in many medicines (both prescribed and over-the-counter medicines). Read all labels to be sure you don't take too much.    For children, check the Tylenol bottle for the right dose (based on their age or weight).    3. If you have other health problems (like cancer, heart failure, an organ transplant or severe kidney disease): Call your specialty clinic if you don't feel better in the next 2 days.    4. Know when to call 911: Emergency warning signs include:    Trouble breathing or shortness of breath    Pain or pressure in the chest that doesn't go away    Feeling confused like you haven't felt before, or not being able to wake up                  *     Bluish-colored lips or  face    Where can I get more information?    [facility]: [email or web address]    Coronavirus Basics: www.[email or web address].[email or web address]./diseases/coronavirus/basics.html    What to Do If You're Sick: www.cdc.gov/coronavirus/2019-ncov/about/steps-when-sick.html    Ending Home Isolation: www.cdc.gov/coronavirus/2019-ncov/hcp/disposition-in-home-patients.html     Caring for Someone with COVID-19: www.cdc.gov/coronavirus/2019-ncov/if-you-are-sick/care-for-someone.html     [facility] clinical trials (COVID-19 research studies): [email or web address]     Emmy Alejandro RN  [facility] Triage Nurse Advisor

## 2021-06-15 NOTE — TELEPHONE ENCOUNTER
RN cannot approve Refill Request    RN can NOT refill this medication Protocol failed and NO refill given. Last office visit: 10/24/2019 Ryan Byrd MD Last Physical: 3/13/2018 Last MTM visit: Visit date not found Last visit same specialty: 10/24/2019 Ryan Byrd MD.  Next visit within 3 mo: Visit date not found  Next physical within 3 mo: Visit date not found      Ron Loaiza, [facility] Triage/Med Refill 2/12/2021    Requested Prescriptions   Pending Prescriptions Disp Refills     atorvastatin (LIPITOR) 40 MG tablet 90 tablet 3     Sig: TAKE 1 TABLET BY MOUTH EVERYDAY AT BEDTIME       Statins Refill Protocol (Hmg CoA Reductase Inhibitors) Failed - 2/12/2021 10:07 AM        Failed - PCP or prescribing provider visit in past 12 months      Last office visit with prescriber/PCP: 10/24/2019 Ryan Byrd MD OR same dept: Visit date not found OR same specialty: 10/24/2019 yRan Byrd MD  Last physical: 3/13/2018 Last MTM visit: Visit date not found   Next visit within 3 mo: Visit date not found  Next physical within 3 mo: Visit date not found  Prescriber OR PCP: Ryan Byrd MD  Last diagnosis associated with med order: 1. Mixed hyperlipidemia  - atorvastatin (LIPITOR) 40 MG tablet; TAKE 1 TABLET BY MOUTH EVERYDAY AT BEDTIME  Dispense: 90 tablet; Refill: 3    If protocol passes may refill for 12 months if within 3 months of last provider visit (or a total of 15 months).

## 2021-06-15 NOTE — TELEPHONE ENCOUNTER
RN cannot approve Refill Request    RN can NOT refill this medication PCP messaged that patient is overdue for Office Visit and Protocol failed and NO refill given. Patient is no longer with [facility]. Switched to [facility]. Last office visit: 10/24/2019 Ryan Byrd MD Last Physical: 3/13/2018 Last MTM visit: Visit date not found Last visit same specialty: 10/24/2019 Ryan Byrd MD.  Next visit within 3 mo: Visit date not found  Next physical within 3 mo: Visit date not found      Veronica Fontanez, [facility] Triage/Med Refill 2/9/2021    Requested Prescriptions   Pending Prescriptions Disp Refills     atorvastatin (LIPITOR) 40 MG tablet [Pharmacy Med Name: ATORVASTATIN 40 MG TABLET] 90 tablet 0     Sig: TAKE 1 TABLET BY MOUTH EVERYDAY AT BEDTIME       Statins Refill Protocol (Hmg CoA Reductase Inhibitors) Failed - 2/9/2021 12:11 AM        Failed - PCP or prescribing provider visit in past 12 months      Last office visit with prescriber/PCP: 10/24/2019 Ryan Byrd MD OR same dept: Visit date not found OR same specialty: 10/24/2019 Ryan Byrd MD  Last physical: 3/13/2018 Last MTM visit: Visit date not found   Next visit within 3 mo: Visit date not found  Next physical within 3 mo: Visit date not found  Prescriber OR PCP: Ryan Byrd MD  Last diagnosis associated with med order: 1. Mixed hyperlipidemia  - atorvastatin (LIPITOR) 40 MG tablet [Pharmacy Med Name: ATORVASTATIN 40 MG TABLET]; TAKE 1 TABLET BY MOUTH EVERYDAY AT BEDTIME  Dispense: 90 tablet; Refill: 0    If protocol passes may refill for 12 months if within 3 months of last provider visit (or a total of 15 months).

## 2021-06-16 PROBLEM — E78.5 HYPERLIPIDEMIA: Status: ACTIVE | Noted: 2018-03-19

## 2021-06-16 NOTE — PROGRESS NOTES
Assessment and Plan:     1. Encounter to establish care with new doctor  Established primary care. See below    2. Influenza vaccination given  - Influenza High Dose, Seasonal 65+ yrs    3. Encounter for preventive health examination  He is medically stable.  Identified potential issues regarding possibly significant hyperlipidemia and strong family history of CAD in brothers and father.  History of colonic polyp in 2013.  Needs follow up colonoscopy.      4. Medicare annual wellness visit, initial  See below    5. Screening for colon cancer  Set up for colonoscopy  - Ambulatory referral for Colonoscopy    6. Hyperlipidemia  Check below, he is fasting.  If LDL remains significantly elevated I will recommend statin therapy for primary prevention.  We discussed this.  He will continue his aspirin daily, 81 mg.   - Lipid Profile  - HM1(CBC and Differential)  - HM1 (CBC with Diff)  - Comprehensive Metabolic Panel    7. Screening for prostate cancer  No significant symptoms regarding prostate function  - PSA, Annual Screen (Prostatic-Specific Antigen)     I have had an Advance Directives discussion with the patient.  He has a living will completed and family is aware.  We discussed the pros and cons of cardiac resuscitation.  At his age, and his good health, I recommended that he allow resuscitation, should cardiorespiratory arrest occur.  He is clear that that is his wishes, he just doesn't now want artificial life support or artificial feeding if there is no reasonable hope of mental recovery.     The following health maintenance schedule was reviewed with the patient and provided in printed form in the after visit summary:     Health Maintenance   Topic Date Due     ADVANCE DIRECTIVES DISCUSSED WITH PATIENT  10/31/1968     PNEUMOCOCCAL POLYSACCHARIDE VACCINE AGE 65 AND OVER  10/31/2014     PNEUMOCOCCAL CONJUGATE VACCINE FOR ADULTS (PCV13 OR PREVNAR)  10/31/2014     FALL RISK ASSESSMENT  03/13/2019     TD 18+ HE   02/07/2021     COLONOSCOPY  03/04/2023     INFLUENZA VACCINE RULE BASED  Completed     ZOSTER VACCINE  Completed      Flu shot given, he will consider the new zoster shot in future appointments.     Subjective:   Chief Complaint: Delmar Archuleta is an 67 y.o. male here for an Annual Wellness visit.   HPI:  He feels well. 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  Does smoke cigars and has a few alcohol drinks per day.  Does not regularly exercise.  Tolerates activity well, walking, climbing stairs.  Two grand children live nearby, doing well.  No voiding symptoms, hesitancy or nocturia.  No bowel symptoms, or significant dyspnea.  Gets an occasional 'catch' like right lateral chest stabbing pain, that resolves.  Can be brought on by certain truncal movements.  No unusual cough or fever, or any symptoms of bleeding.      Review of Systems:   Please see above.  The rest of the review of systems are negative for all systems.    Patient Care Team:  Ryan Byrd MD as PCP - General (Internal Medicine)     Patient Active Problem List   Diagnosis     Benign Polyps Of The Large Intestine     Past Medical History:   Diagnosis Date     Colon polyps      History of arm fracture      Hyperlipidemia       Past Surgical History:   Procedure Laterality Date     NO PAST SURGERIES        Family History   Problem Relation Age of Onset     Coronary artery disease Father      Breast cancer Sister      Coronary artery disease Brother       Social History     Social History     Marital status: Single     Spouse name: N/A     Number of children: 2     Years of education: N/A     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       No current facility-administered medications for this visit.       Objective:   Vital Signs:   Visit Vitals     /74 (Patient Site: Left Arm, Patient Position: Sitting, Cuff Size: Adult Regular)     Pulse 72     Resp 16     Ht 6' 2\" (1.88 m)     Wt 207 lb 7 oz (94.1 kg)     SpO2 96%     BMI 26.63 kg/m2      PHYSICAL EXAM  Vital signs are normal.  General - well appearing in NAD,  Neck supple, no adenopathy or thyromegaly, Chest - clear to auscultation, no wheeze or rhonchi heard, Heart - S1 S2 without murmur, Abdomen - soft, flat, and non-tender, no mass or rebound, or liver or spleen enlargement noted.   Extremities - diminished distal pulses, no edema, or ulcer or inflammation,  - testes and penis are normal,  Rectal deferred,  Neuro - speech is normal, gait is normal, moves all four normally, no gross sensory abnormalities,     Assessment Results 3/13/2018   Activities of Daily Living No help needed   Instrumental Activities of Daily Living No help needed   Get Up and Go Score Less than 12 seconds   Mini Cog Total Score 5   Some recent data might be hidden     A Mini-Cog score of 0-2 suggests the possibility of dementia, score of 3-5 suggests no dementia    Identified Health Risks:     Patient's advanced directive was discussed and I am comfortable with the patient's wishes.        "

## 2021-06-17 NOTE — PROGRESS NOTES
Delmar Archuleta is a 70 y.o. male who is being evaluated via a billable video visit.      How would you like to obtain your AVS? MyChart.  If dropped from the video visit, the video invitation should be resent by: Send to e-mail at: [email or web address]  Will anyone else be joining your video visit? No    Video Start Time: 5:18PM     ASSESSMENT AND PLAN:    1. Covid infection  Mild infection 6 weeks ago.  Now s/p vaccination.      2. Cough  With mild BOO.  Clinically suggestive of post viral cough, perhaps element of bronchospasm.  I offered course of azithromycin and albuterol.  He declines.  Will follow symptoms and let us know if symptoms progress or fail to improve.      CHIEF COMPLAINT:  Chief Complaint   Patient presents with     Cough     Amwell Video x 1 week Sxs: persistent dry cough & sob, and wheezing. No fever, no sob - not using anything otc for sxs's      HISTORY OF PRESENT ILLNESS:  Delmar Archuleta is a 70 y.o. male with cough and mild BOO.  Had Covid - mild cough/congestion - about 6 weeks ago.  Also, got vaccination recently.  Recently went to [place] and played golf for 6 days, tolerated that well.  No pleuritic pain, or orthopnea or PND.  Uses CPAP without issues.  No fever, or chills.  Actually feels well, minor sputum.  No headache.      REVIEW OF SYSTEMS:   See HPI, all other systems on review are negative.    Past Medical History:   Diagnosis Date     Colon polyps      History of arm fracture      Hyperlipidemia      Social History     Tobacco Use   Smoking Status Current Every Day Smoker     Types: Cigars   Smokeless Tobacco Never Used     Family History   Problem Relation Age of Onset     Coronary artery disease Father      Breast cancer Sister      Coronary artery disease Brother      Sleep apnea Brother      Past Surgical History:   Procedure Laterality Date     FRACTURE SURGERY       NO PAST SURGERIES       VITALS:  There were no vitals filed for this visit.  Wt Readings from Last 3  Encounters:   12/19/19 214 lb (97.1 kg)   11/19/19 214 lb 12.8 oz (97.4 kg)   10/24/19 215 lb (97.5 kg)     PHYSICAL EXAM:  Constitutional:  In NAD, alert and oriented  Psychiatric:  Mood and behavior appropriate, thinking is clear.     Current Outpatient Medications   Medication Sig Dispense Refill     aspirin 81 MG EC tablet Take 81 mg by mouth daily.       atorvastatin (LIPITOR) 40 MG tablet TAKE 1 TABLET BY MOUTH EVERYDAY AT BEDTIME 90 tablet 3     No current facility-administered medications for this visit.      Video-Visit Details    Type of service:  Video Visit    Video End Time (time video stopped): 5:26PM  Originating Location (pt. Location): Home    Distant Location (provider location):  [facility]     Platform used for Video Visit: Cloudmeter

## 2021-06-17 NOTE — PATIENT INSTRUCTIONS - HE
"Patient Instructions by Leo Mckinley DO at 8/29/2019  2:00 PM     Author: Leo Mckinley DO Service: -- Author Type: Physician    Filed: 8/29/2019  2:10 PM Encounter Date: 8/29/2019 Status: Addendum    : Leo Mckinley DO (Physician)    Related Notes: Original Note by Leo Mckinley DO (Physician) filed at 8/29/2019  2:07 PM         What is sleep apnea?    Sleep apnea is a condition that makes you stop breathing for short periods while you are asleep. There are 2 types of sleep apnea. One is called \"obstructive sleep apnea,\" and the other is called \"central sleep apnea.\"  In obstructive sleep apnea, you stop breathing because your throat narrows or closes (figure 1). In central sleep apnea, you stop breathing because your brain does not send the right signals to your muscles to make you breathe. When people talk about sleep apnea, they are usually referring to obstructive sleep apnea, which is what this article is about.  People with sleep apnea do not know that they stop breathing when they are asleep. But they do sometimes wake up startled or gasping for breath. They also often hear from loved ones that they snore.  What are the symptoms of sleep apnea? -- The main symptoms of sleep apnea are loud snoring, tiredness, and daytime sleepiness. Other symptoms can include:  ?Restless sleep  ?Waking up choking or gasping  ?Morning headaches, dry mouth, or sore throat  ?Waking up often to urinate  ?Waking up feeling unrested or groggy  ?Trouble thinking clearly or remembering things  Some people with sleep apnea don't have symptoms, or they don't know they have them. They might figure that it's normal to be tired or to snore a lot.  Should I see a doctor or nurse? -- Yes. If you think you might have sleep apnea, see your doctor.  Is there a test for sleep apnea? -- Yes. If your doctor or nurse suspects you have sleep apnea, he or she might send you for a \"sleep study.\" Sleep studies can sometimes be " "done at home, but they are usually done in a sleep lab. For the study, you spend the night in the lab, and you are hooked up to different machines that monitor your heart rate, breathing, and other body functions. The results of the test will tell your doctor or nurse if you have the disorder.  Is there anything I can do on my own to help my sleep apnea? -- Yes. Here are some things that might help:  ?Stay off your back when sleeping. (This is not always practical, because people cannot control their position while asleep. Plus, it only helps some people.)  ?Lose weight, if you are overweight  ?Avoid alcohol, because it can make sleep apnea worse  How is sleep apnea treated? -- The most effective treatment for sleep apnea is a device that keeps your airway open while you sleep. Treatment with this device is called \"continuous positive airway pressure,\" or CPAP. People getting CPAP wear a face mask at night that keeps them breathing (figure 2).  If your doctor or nurse recommends a CPAP machine, try to be patient about using it. The mask might seem uncomfortable to wear at first, and the machine might seem noisy, but using the machine can really pay off. People with sleep apnea who use a CPAP machine feel more rested and generally feel better.  There is also another device that you wear in your mouth called an \"oral appliance\" or \"mandibular advancement device.\" It also helps keep your airway open while you sleep.  In rare cases, when nothing else helps, doctors recommend surgery to keep the airway open. Surgery to do this is not always effective, and even when it is, the problem can come back.  Is sleep apnea dangerous? -- It can be. People with sleep apnea do not get good-quality sleep, so they are often tired and not alert. This puts them at risk for car accidents and other types of accidents. Plus, studies show that people with sleep apnea are more likely than others to have high blood pressure, heart attacks, " and other serious heart problems. In people with severe sleep apnea, getting treated (for example, with a CPAP machine) can help prevent some of these problems.    GRAPHICS  Airway in a person with sleep apnea    Normally when a person sleeps, the airway remains open, and air can pass from the nose and mouth to the lungs. In a person with sleep apnea, parts of the throat and mouth drop into the airway and block off the flow of air. This can cause loud snoring and interrupt breathing for short periods.  Graphic 07353 Version 5.0    Continuous positive airway pressure (CPAP) for sleep apnea    The CPAP mask gently blows air into your nose while you sleep. It puts just enough pressure on your airway to keep it from closing. The mask in this picture fits over just the nose. Other CPAP devices have masks that fit over the nose and mouth.    Please bring your machine, mask, hose and power cord on the next clinical visit with me. Thank you.

## 2021-06-17 NOTE — PATIENT INSTRUCTIONS - HE
Patient Instructions by Leo Mckinley DO at 6/7/2019 10:20 AM     Author: Leo Mckinley DO Service: -- Author Type: Physician    Filed: 6/7/2019  9:50 AM Encounter Date: 6/7/2019 Status: Addendum    : Leo Mckinley DO (Physician)    Related Notes: Original Note by Leo Mckinley DO (Physician) filed at 6/7/2019  9:50 AM       Please follow up with your primary care provider to check your blood pressure in one week if clinically indicated.      Patient education: What is a sleep study?     What is a sleep study? -- A sleep study is a test that measures how well you sleep and checks for sleep problems. For some sleep studies, you stay overnight in a sleep lab at a hospital or sleep center.     What happens during a sleep study? -- Before you go to sleep, a technician attaches small, sticky patches called electrodes to your head, chest, and legs. He or she will also place a small tube beneath your nose and might wrap 1 or 2 belts around your chest.   Each of these items has wires that connect to monitors. The monitors record your movement, brain activity, breathing, and other body functions while you sleep.  If you have a history of trouble falling asleep, your doctor might prescribe a medicine to help you fall asleep in the lab. If you have never taken the medicine before, your doctor might ask you take it on a night before your sleep study to see how it affects you.   Why might my doctor order a sleep study? -- Your doctor will order a sleep study if he or she thinks you have sleep apnea or a different condition that makes you:   ?Have sudden jerking leg movements while you sleep, called periodic limb movements.   ?Feel very sleepy during the day and fall asleep all of a sudden, called narcolepsy.   ?Have trouble falling asleep or staying asleep over a long period of time, called chronic insomnia.   ?Do odd things while you sleep, such as walking.  How should I prepare for a sleep study? -- On  the day of your sleep study, you should:   ?Avoid alcohol   ?Avoid drinking coffee, tea, sodas, and other drinks that have caffeine in the afternoon and evening   ?Take all of your regular medicines    The cost of care estimate line is [phone]. They are able to give the patient an estimate of the charges and also an estimate of their insurance coverage/patient responsibility.    Please bring one tab of low dose melatonin 3 mg or less to the night of the study.    If the patient wishes to take the melatonin. It is completely voluntary.    Patient may take own melatonin after arrival to sleep center. Do not drive or operate machinery after intake of melatonin.

## 2021-06-17 NOTE — PATIENT INSTRUCTIONS - HE
Patient Instructions by Denisse Berrios PT at 12/12/2019  9:30 AM     Author: Denisse Berrios PT Service: -- Author Type: Physical Therapist    Filed: 12/12/2019  9:57 AM Encounter Date: 12/12/2019 Status: Addendum    : Denisse eBrrios PT (Physical Therapist)    Related Notes: Original Note by Denisse Berrios PT (Physical Therapist) filed at 12/12/2019  9:56 AM        TERMINAL KNEE EXTENSION - TKE    Start in a standing position with an elastic band attached to your slightly bent knee.  (Your toes should be touching the floor)    Next, draw your knee back towards a straightened position so that your heel touches the floor.    15x each leg  1-2x/day      BRACE MARCHING    While lying on your back with your knees bent,  slowly raise up one foot a few inches and then set it back down.  Next, perform on your other leg.  Use your stomach muscles to keep your spine from moving.    10x each leg    1-2x/day     Step-ups  Standing on step with hand by railing, step up onto the step with the each leg 10x  1x/day       HIP FLEXION ISOMETERIC - SINGLE LEG    While lying on your back, raise up a knee into and press it into your hands.    10x each leg hold for 5 seconds

## 2021-06-17 NOTE — PATIENT INSTRUCTIONS - HE
"Patient Instructions by Denisse Berrios PT at 11/27/2019  9:30 AM     Author: Denisse Berrios PT Service: -- Author Type: Physical Therapist    Filed: 11/27/2019 10:17 AM Encounter Date: 11/27/2019 Status: Signed    : Denisse Berrios PT (Physical Therapist)          PRONE HAMSTRING CURLS    While lying face down, slowly bend your knee as you bring your foot towards your buttock.    x10-15 on left leg       CLAM SHELLS    While lying on your side with your knees bent, draw up the top knee while keeping contact of your feet together.    Do not let your pelvis roll back during the lifting movement.        BRIDGING    While lying on your back, tighten your lower abdominals, squeeze your buttocks and then raise your buttocks off the floor/bed as creating a \"Bridge\" with your body.         Lie on your back   - Grab behind your knee slightly pulling your knee to your chest (you can use a towel if needed)   - Straighten the leg as far as you can. Get a nice easy stretch. Do not hold   - Bring the leg down      SIT TO STAND - arms across chest     While making sure you bend at the hip, SLOWLY sit down    Your chest may come forward over your toes, but your spine should stay in neutral as shown.                "

## 2021-06-19 NOTE — PROGRESS NOTES
" ASSESSMENT AND PLAN:    1. Screening for colon cancer  Will set up.   - Ambulatory referral for Colonoscopy    2. Mixed hyperlipidemia  Ongoing treatment will assess progress  - Lipid Profile  - Comprehensive Metabolic Panel    3. Acute gout  Two episodes, not on diuretic therapy.  Renal function has been normal. Exam today is consistent with podagra.  He is not interested in preventive therapy unless attacks continue.  Will use ibuprofen 600 mg at onset, and 400 mg po Q4H prn until resolved.   - Uric Acid,     CHIEF COMPLAINT:  Chief Complaint   Patient presents with     Gout     \"I  feel i have gout on my bilatrial foot\"     HISTORY OF PRESENT ILLNESS:  Delmar Archuleta is a 67 y.o. male with two episodes of acute pain in the right foot, including now the great toe.  No trauma or fever.  No history of gout and has otherwise been feeling well.      REVIEW OF SYSTEMS:   See HPI, all other pertinent systems on review are negative.    Active Ambulatory Problems     Diagnosis Date Noted     Benign Polyps Of The Large Intestine      Hyperlipidemia 03/19/2018     Cervical spondylosis without myelopathy 12/18/2007     Resolved Ambulatory Problems     Diagnosis Date Noted     Hypercholesterolemia      Sore Throat      Past Medical History:   Diagnosis Date     Colon polyps      History of arm fracture      Hyperlipidemia        Past Surgical History:   Procedure Laterality Date     NO PAST SURGERIES       VITALS:  Vitals:    08/06/18 1058   BP: 128/70   Patient Site: Left Arm   Patient Position: Sitting   Cuff Size: Adult Regular   Pulse: 68   SpO2: 98%   Weight: 204 lb (92.5 kg)     Wt Readings from Last 3 Encounters:   08/06/18 204 lb (92.5 kg)   03/13/18 207 lb 7 oz (94.1 kg)     PHYSICAL EXAM:  Constitutional:  Well appearing in NAD, alert and oriented  Extremities: right podagra     Current Outpatient Prescriptions   Medication Sig Dispense Refill     aspirin 81 MG EC tablet Take 81 mg by mouth daily.       " atorvastatin (LIPITOR) 40 MG tablet Take 1 tablet (40 mg total) by mouth at bedtime. 30 tablet 12     No current facility-administered medications for this visit.      Ryan Byrd MD  Internal Medicine  [facility]

## 2021-06-27 ENCOUNTER — HEALTH MAINTENANCE LETTER (OUTPATIENT)
Age: 71
End: 2021-06-27

## 2021-07-03 NOTE — ADDENDUM NOTE
Addendum Note by Mckay Rogers MD at 11/14/2019  9:13 AM     Author: Mckay Rogers MD Service: -- Author Type: Physician    Filed: 11/14/2019  9:13 AM Encounter Date: 11/10/2019 Status: Signed    : Mckay Rogers MD (Physician)    Addended by: MCKAY ROGERS on: 11/14/2019 09:13 AM        Modules accepted: Orders

## 2021-07-03 NOTE — ADDENDUM NOTE
Addendum Note by Nadia Hastings MLT at 8/7/2018  3:02 PM     Author: Nadia Hastings MLT Service: -- Author Type:     Filed: 8/7/2018  3:02 PM Encounter Date: 8/6/2018 Status: Signed    : Nadia Hastings MLT ()    Addended by: NADIA HASTINGS on: 8/7/2018 03:02 PM        Modules accepted: Orders

## 2021-07-03 NOTE — ADDENDUM NOTE
Addendum Note by Mckay Rogers MD at 10/31/2019 10:45 AM     Author: Mckay Rogers MD Service: -- Author Type: Physician    Filed: 10/31/2019 10:45 AM Encounter Date: 10/29/2019 Status: Signed    : Mckay Rogers MD (Physician)    Addended by: MCKAY ROGERS on: 10/31/2019 10:45 AM        Modules accepted: Orders

## 2021-10-17 ENCOUNTER — HEALTH MAINTENANCE LETTER (OUTPATIENT)
Age: 71
End: 2021-10-17

## 2022-04-27 DIAGNOSIS — E78.2 MIXED HYPERLIPIDEMIA: ICD-10-CM

## 2022-04-27 NOTE — TELEPHONE ENCOUNTER
Refill Request  Medication name: Pending Prescriptions:                       Disp   Refills    atorvastatin (LIPITOR) 40 MG tablet       90 tab*3          Who prescribed the medication: PCP  Last refill on medication: 2/15/21  Requested Pharmacy: [facility]  Last appointment with PCP: 1/6/22  /Next appointment: Not due

## 2022-04-30 NOTE — TELEPHONE ENCOUNTER
"Routing refill request to provider for review/approval because:  Labs not current:  LDL    Last Written Prescription Date:  2/15/21  Last Fill Quantity: 90,  # refills: 3   Last office visit provider:  4/28/21     Requested Prescriptions   Pending Prescriptions Disp Refills     atorvastatin (LIPITOR) 40 MG tablet 90 tablet 3       Statins Protocol Failed - 4/27/2022 10:24 AM        Failed - LDL on file in past 12 months     Recent Labs   Lab Test 08/06/18  1116                Passed - No abnormal creatine kinase in past 12 months     No lab results found.             Passed - Recent (12 mo) or future (30 days) visit within the authorizing provider's specialty     Patient has had an office visit with the authorizing provider or a provider within the authorizing providers department within the previous 12 mos or has a future within next 30 days. See \"Patient Info\" tab in inbasket, or \"Choose Columns\" in Meds & Orders section of the refill encounter.              Passed - Medication is active on med list        Passed - Patient is age 18 or older             Hilda Glynn 04/30/22 3:45 PM  "

## 2022-05-03 RX ORDER — ATORVASTATIN CALCIUM 40 MG/1
TABLET, FILM COATED ORAL
Qty: 90 TABLET | Refills: 3 | Status: SHIPPED | OUTPATIENT
Start: 2022-05-03 | End: 2023-05-04

## 2022-07-23 ENCOUNTER — HEALTH MAINTENANCE LETTER (OUTPATIENT)
Age: 72
End: 2022-07-23

## 2022-10-01 ENCOUNTER — HEALTH MAINTENANCE LETTER (OUTPATIENT)
Age: 72
End: 2022-10-01

## 2023-05-03 DIAGNOSIS — E78.2 MIXED HYPERLIPIDEMIA: ICD-10-CM

## 2023-05-04 NOTE — TELEPHONE ENCOUNTER
"Routing refill request to provider for review/approval because:  Labs not current:  LDL  Patient needs to be seen because it has been more than 2 years since last office visit.    Last Written Prescription Date:  5/3/22  Last Fill Quantity: 90,  # refills: 3   Last office visit provider:  4/28/21     Requested Prescriptions   Pending Prescriptions Disp Refills     atorvastatin (LIPITOR) 40 MG tablet 90 tablet 3     Sig: [ATORVASTATIN (LIPITOR) 40 MG TABLET] TAKE 1 TABLET BY MOUTH EVERYDAY AT BEDTIME       Statins Protocol Failed - 5/4/2023 11:32 AM        Failed - LDL on file in past 12 months     Recent Labs   Lab Test 08/06/18  1116                Failed - Recent (12 mo) or future (30 days) visit within the authorizing provider's specialty     Patient has had an office visit with the authorizing provider or a provider within the authorizing providers department within the previous 12 mos or has a future within next 30 days. See \"Patient Info\" tab in inbasket, or \"Choose Columns\" in Meds & Orders section of the refill encounter.              Passed - No abnormal creatine kinase in past 12 months     No lab results found.             Passed - Medication is active on med list        Passed - Patient is age 18 or older             Ron Loaiza RN 05/04/23 11:32 AM  "

## 2023-05-08 RX ORDER — ATORVASTATIN CALCIUM 40 MG/1
40 TABLET, FILM COATED ORAL DAILY
Qty: 90 TABLET | Refills: 0 | Status: SHIPPED | OUTPATIENT
Start: 2023-05-08 | End: 2023-08-07

## 2023-05-18 ASSESSMENT — ENCOUNTER SYMPTOMS
WEAKNESS: 1
DYSURIA: 0
CHILLS: 0
HEMATURIA: 0
CONSTIPATION: 0
HEMATOCHEZIA: 0
MYALGIAS: 1
JOINT SWELLING: 0
SHORTNESS OF BREATH: 1
DIZZINESS: 0
DIARRHEA: 0
SORE THROAT: 0
NERVOUS/ANXIOUS: 0
PARESTHESIAS: 0
PALPITATIONS: 0
ARTHRALGIAS: 1
HEADACHES: 0
HEARTBURN: 0
FREQUENCY: 0
NAUSEA: 0
EYE PAIN: 0
ABDOMINAL PAIN: 0
FEVER: 0
COUGH: 1

## 2023-05-18 ASSESSMENT — ACTIVITIES OF DAILY LIVING (ADL): CURRENT_FUNCTION: NO ASSISTANCE NEEDED

## 2023-05-25 ENCOUNTER — OFFICE VISIT (OUTPATIENT)
Dept: INTERNAL MEDICINE | Facility: CLINIC | Age: 73
End: 2023-05-25
Payer: COMMERCIAL

## 2023-05-25 ENCOUNTER — ANCILLARY PROCEDURE (OUTPATIENT)
Dept: GENERAL RADIOLOGY | Facility: CLINIC | Age: 73
End: 2023-05-25
Attending: INTERNAL MEDICINE
Payer: COMMERCIAL

## 2023-05-25 VITALS
OXYGEN SATURATION: 96 % | WEIGHT: 214 LBS | TEMPERATURE: 98.6 F | DIASTOLIC BLOOD PRESSURE: 84 MMHG | BODY MASS INDEX: 26.61 KG/M2 | RESPIRATION RATE: 18 BRPM | SYSTOLIC BLOOD PRESSURE: 130 MMHG | HEART RATE: 60 BPM | HEIGHT: 75 IN

## 2023-05-25 DIAGNOSIS — G47.33 OSA ON CPAP: Primary | ICD-10-CM

## 2023-05-25 DIAGNOSIS — R06.02 SHORTNESS OF BREATH: ICD-10-CM

## 2023-05-25 DIAGNOSIS — Z12.11 SCREEN FOR COLON CANCER: ICD-10-CM

## 2023-05-25 DIAGNOSIS — H90.3 BILATERAL SENSORINEURAL HEARING LOSS: ICD-10-CM

## 2023-05-25 DIAGNOSIS — M25.552 HIP PAIN, LEFT: ICD-10-CM

## 2023-05-25 DIAGNOSIS — E78.5 HYPERLIPIDEMIA LDL GOAL <100: ICD-10-CM

## 2023-05-25 DIAGNOSIS — Z11.59 NEED FOR HEPATITIS C SCREENING TEST: ICD-10-CM

## 2023-05-25 LAB
ALBUMIN SERPL BCG-MCNC: 4.5 G/DL (ref 3.5–5.2)
ALP SERPL-CCNC: 87 U/L (ref 40–129)
ALT SERPL W P-5'-P-CCNC: 23 U/L (ref 10–50)
ANION GAP SERPL CALCULATED.3IONS-SCNC: 13 MMOL/L (ref 7–15)
AST SERPL W P-5'-P-CCNC: 25 U/L (ref 10–50)
BILIRUB SERPL-MCNC: 0.7 MG/DL
BUN SERPL-MCNC: 13.2 MG/DL (ref 8–23)
CALCIUM SERPL-MCNC: 9.5 MG/DL (ref 8.8–10.2)
CHLORIDE SERPL-SCNC: 103 MMOL/L (ref 98–107)
CHOLEST SERPL-MCNC: 159 MG/DL
CREAT SERPL-MCNC: 1.04 MG/DL (ref 0.67–1.17)
DEPRECATED HCO3 PLAS-SCNC: 23 MMOL/L (ref 22–29)
ERYTHROCYTE [DISTWIDTH] IN BLOOD BY AUTOMATED COUNT: 12.3 % (ref 10–15)
GFR SERPL CREATININE-BSD FRML MDRD: 76 ML/MIN/1.73M2
GLUCOSE SERPL-MCNC: 97 MG/DL (ref 70–99)
HCT VFR BLD AUTO: 45.8 % (ref 40–53)
HCV AB SERPL QL IA: NONREACTIVE
HDLC SERPL-MCNC: 36 MG/DL
HGB BLD-MCNC: 15.2 G/DL (ref 13.3–17.7)
LDLC SERPL CALC-MCNC: 94 MG/DL
MCH RBC QN AUTO: 31.1 PG (ref 26.5–33)
MCHC RBC AUTO-ENTMCNC: 33.2 G/DL (ref 31.5–36.5)
MCV RBC AUTO: 94 FL (ref 78–100)
NONHDLC SERPL-MCNC: 123 MG/DL
PLATELET # BLD AUTO: 196 10E3/UL (ref 150–450)
POTASSIUM SERPL-SCNC: 4.6 MMOL/L (ref 3.4–5.3)
PROT SERPL-MCNC: 7.6 G/DL (ref 6.4–8.3)
RBC # BLD AUTO: 4.89 10E6/UL (ref 4.4–5.9)
SODIUM SERPL-SCNC: 139 MMOL/L (ref 136–145)
TRIGL SERPL-MCNC: 143 MG/DL
WBC # BLD AUTO: 6.1 10E3/UL (ref 4–11)

## 2023-05-25 PROCEDURE — 80053 COMPREHEN METABOLIC PANEL: CPT | Performed by: INTERNAL MEDICINE

## 2023-05-25 PROCEDURE — 99214 OFFICE O/P EST MOD 30 MIN: CPT | Mod: 25 | Performed by: INTERNAL MEDICINE

## 2023-05-25 PROCEDURE — 80061 LIPID PANEL: CPT | Performed by: INTERNAL MEDICINE

## 2023-05-25 PROCEDURE — 36415 COLL VENOUS BLD VENIPUNCTURE: CPT | Performed by: INTERNAL MEDICINE

## 2023-05-25 PROCEDURE — 85027 COMPLETE CBC AUTOMATED: CPT | Performed by: INTERNAL MEDICINE

## 2023-05-25 PROCEDURE — G0439 PPPS, SUBSEQ VISIT: HCPCS | Performed by: INTERNAL MEDICINE

## 2023-05-25 PROCEDURE — 73502 X-RAY EXAM HIP UNI 2-3 VIEWS: CPT | Mod: TC | Performed by: RADIOLOGY

## 2023-05-25 PROCEDURE — 86803 HEPATITIS C AB TEST: CPT | Performed by: INTERNAL MEDICINE

## 2023-05-25 ASSESSMENT — ACTIVITIES OF DAILY LIVING (ADL)
FALL_HISTORY_WITHIN_LAST_SIX_MONTHS: NO
CONCENTRATING,_REMEMBERING_OR_MAKING_DECISIONS_DIFFICULTY: NO
PATIENT'S_PREFERRED_MEANS_OF_COMMUNICATION: ENGLISH SPEAKER WITH HEARING LOSS, NO SPEECH PROBLEMS.
THE_FOLLOWING_AIDS_WERE_PROVIDED;: PATIENT DECLINED OFFER OF AUXILIARY AIDS
WALKING_OR_CLIMBING_STAIRS_DIFFICULTY: NO
DOING_ERRANDS_INDEPENDENTLY_DIFFICULTY: NO
DIFFICULTY_COMMUNICATING: NO
CURRENT_FUNCTION: NO ASSISTANCE NEEDED
DRESSING/BATHING_DIFFICULTY: NO
DESCRIBE_HEARING_LOSS: BILATERAL HEARING LOSS
CHANGE_IN_FUNCTIONAL_STATUS_SINCE_ONSET_OF_CURRENT_ILLNESS/INJURY: NO
WEAR_GLASSES_OR_BLIND: NO
WERE_AUXILIARY_AIDS_OFFERED?: NO
TOILETING_ISSUES: NO
DIFFICULTY_EATING/SWALLOWING: NO
HEARING_DIFFICULTY_OR_DEAF: YES

## 2023-05-25 ASSESSMENT — ENCOUNTER SYMPTOMS
SORE THROAT: 0
ABDOMINAL PAIN: 0
FREQUENCY: 0
DYSURIA: 0
HEMATURIA: 0
HEMATOCHEZIA: 0
CHILLS: 0
DIZZINESS: 0
JOINT SWELLING: 0
MYALGIAS: 1
NAUSEA: 0
WEAKNESS: 1
HEADACHES: 0
PALPITATIONS: 0
DIARRHEA: 0
COUGH: 1
FEVER: 0
SHORTNESS OF BREATH: 1
ARTHRALGIAS: 1
HEARTBURN: 0
PARESTHESIAS: 0
CONSTIPATION: 0
EYE PAIN: 0
NERVOUS/ANXIOUS: 0

## 2023-05-25 NOTE — LETTER
May 29, 2023      Charles Moreno  [address]  [place] [address]        Dear ,    We are writing to inform you of your test results.    Your tests are all good.      Resulted Orders   Hepatitis C Screen Reflex to HCV RNA Quant and Genotype   Result Value Ref Range    Hepatitis C Antibody Nonreactive Nonreactive    Narrative    Assay performance characteristics have not been established for newborns, infants, and children.   CBC with platelets   Result Value Ref Range    WBC Count 6.1 4.0 - 11.0 10e3/uL    RBC Count 4.89 4.40 - 5.90 10e6/uL    Hemoglobin 15.2 13.3 - 17.7 g/dL    Hematocrit 45.8 40.0 - 53.0 %    MCV 94 78 - 100 fL    MCH 31.1 26.5 - 33.0 pg    MCHC 33.2 31.5 - 36.5 g/dL    RDW 12.3 10.0 - 15.0 %    Platelet Count 196 150 - 450 10e3/uL   Comprehensive metabolic panel   Result Value Ref Range    Sodium 139 136 - 145 mmol/L    Potassium 4.6 3.4 - 5.3 mmol/L    Chloride 103 98 - 107 mmol/L    Carbon Dioxide (CO2) 23 22 - 29 mmol/L    Anion Gap 13 7 - 15 mmol/L    Urea Nitrogen 13.2 8.0 - 23.0 mg/dL    Creatinine 1.04 0.67 - 1.17 mg/dL    Calcium 9.5 8.8 - 10.2 mg/dL    Glucose 97 70 - 99 mg/dL    Alkaline Phosphatase 87 40 - 129 U/L    AST 25 10 - 50 U/L    ALT 23 10 - 50 U/L    Protein Total 7.6 6.4 - 8.3 g/dL    Albumin 4.5 3.5 - 5.2 g/dL    Bilirubin Total 0.7 <=1.2 mg/dL    GFR Estimate 76 >60 mL/min/1.73m2      Comment:      eGFR calculated using 2021 CKD-EPI equation.   Lipid Profile (Chol, Trig, HDL, LDL calc)   Result Value Ref Range    Cholesterol 159 <200 mg/dL    Triglycerides 143 <150 mg/dL    Direct Measure HDL 36 (L) >=40 mg/dL    LDL Cholesterol Calculated 94 <=100 mg/dL    Non HDL Cholesterol 123 <130 mg/dL    Narrative    Cholesterol  Desirable:  <200 mg/dL    Triglycerides  Normal:  Less than 150 mg/dL  Borderline High:  150-199 mg/dL  High:  200-499 mg/dL  Very High:  Greater than or equal to 500 mg/dL    Direct Measure HDL  Female:  Greater than or equal to 50 mg/dL    Male:  Greater than or equal to 40 mg/dL    LDL Cholesterol  Desirable:  <100mg/dL  Above Desirable:  100-129 mg/dL   Borderline High:  130-159 mg/dL   High:  160-189 mg/dL   Very High:  >= 190 mg/dL    Non HDL Cholesterol  Desirable:  130 mg/dL  Above Desirable:  130-159 mg/dL  Borderline High:  160-189 mg/dL  High:  190-219 mg/dL  Very High:  Greater than or equal to 220 mg/dL       If you have any questions or concerns, please call the clinic at the number listed above.       Sincerely,      Ryan Byrd MD

## 2023-05-25 NOTE — PROGRESS NOTES
"SUBJECTIVE:   Charles is a 72 year old who presents for Preventive Visit.      5/25/2023     9:34 AM   Additional Questions   Roomed by Shabnam ALVAREZ   Patient has been advised of split billing requirements and indicates understanding: Yes     HPI:  Will have spells of increased BOO.  No clear chest pain.  Possibly a bit of chest tightness. Does not occur at night.  Last summer went to ER for chest pain evaluation.  Negative evaluation. Did not have stress test.  No palpitations.  Modest alcohol, and uses cigars 2 per day.   Due for colonoscopy. Occasional nocturia.  Bowel function is OK.  Using his CPAP, may need a new machine.     Are you in the first 12 months of your Medicare coverage?  No    Healthy Habits:     In general, how would you rate your overall health?  Good    Frequency of exercise:  None    Do you usually eat at least 4 servings of fruit and vegetables a day, include whole grains    & fiber and avoid regularly eating high fat or \"junk\" foods?  No    Taking medications regularly:  Yes    Medication side effects:  None    Ability to successfully perform activities of daily living:  No assistance needed    Home Safety:  No safety concerns identified    Hearing Impairment:  Difficulty following a conversation in a noisy restaurant or crowded room, need to ask people to speak up or repeat themselves and difficulty understanding soft or whispered speech    In the past 6 months, have you been bothered by leaking of urine?  No    In general, how would you rate your overall mental or emotional health?  Excellent      PHQ-2 Total Score: 0    Additional concerns today:  Yes    Have you ever done Advance Care Planning? (For example, a Health Directive, POLST, or a discussion with a medical provider or your loved ones about your wishes): No, advance care planning information given to patient to review.  Advanced care planning was discussed at today's visit.    Hearing - some issues.   Fall risk  Fallen 2 or more times in " the past year?: No  Any fall with injury in the past year?: No  click delete button to remove this line now  Cognitive Screening   1) Repeat 3 items (Leader, Season, Table)    2) Clock draw: NORMAL  3) 3 item recall: Recalls 3 objects  Results: 3 items recalled: COGNITIVE IMPAIRMENT LESS LIKELY    Mini-CogTM Copyright CESAR Bello. Licensed by the author for use in [facility]; reprinted with permission (sodarren@[facility]). All rights reserved.      Do you have sleep apnea, excessive snoring or daytime drowsiness?: yes    Reviewed and updated as needed this visit by clinical staff   Tobacco  Allergies  Meds            Reviewed and updated as needed this visit by Provider     Social History     Tobacco Use     Smoking status: Every Day     Types: Cigars     Smokeless tobacco: Never   Vaping Use     Vaping status: Not on file   Substance Use Topics     Alcohol use: Yes     Alcohol/week: 8.0 standard drinks of alcohol         5/18/2023     1:41 PM   Alcohol Use   Prescreen: >3 drinks/day or >7 drinks/week? No     Do you have a current opioid prescription? No  Do you use any other controlled substances or medications that are not prescribed by a provider? None    Current providers sharing in care for this patient include:   Patient Care Team:  Ryan Byrd MD as PCP - General (Internal Medicine)    The following health maintenance items are reviewed in Epic and correct as of today:  Health Maintenance   Topic Date Due     ANNUAL REVIEW OF  ORDERS  Never done     Pneumococcal Vaccine: 65+ Years (1 - PCV) Never done     HEPATITIS C SCREENING  Never done     LUNG CANCER SCREENING  Never done     AORTIC ANEURYSM SCREENING (SYSTEM ASSIGNED)  Never done     MEDICARE ANNUAL WELLNESS VISIT  03/13/2019     DTAP/TDAP/TD IMMUNIZATION (2 - Td or Tdap) 02/07/2021     COVID-19 Vaccine (6 - Moderna series) 02/12/2023     COLORECTAL CANCER SCREENING  03/04/2023     ADVANCE CARE PLANNING  03/13/2023     LIPID  08/06/2023      "FALL RISK ASSESSMENT  05/25/2024     PHQ-2 (once per calendar year)  Completed     INFLUENZA VACCINE  Completed     ZOSTER IMMUNIZATION  Completed     IPV IMMUNIZATION  Aged Out     MENINGITIS IMMUNIZATION  Aged Out     Review of Systems   Constitutional: Negative for chills and fever.   HENT: Negative for congestion, ear pain, hearing loss and sore throat.    Eyes: Negative for pain and visual disturbance.   Respiratory: Positive for cough and shortness of breath.    Cardiovascular: Negative for chest pain, palpitations and peripheral edema.   Gastrointestinal: Negative for abdominal pain, constipation, diarrhea, heartburn, hematochezia and nausea.   Genitourinary: Negative for dysuria, frequency, genital sores, hematuria, impotence, penile discharge and urgency.   Musculoskeletal: Positive for arthralgias and myalgias. Negative for joint swelling.   Skin: Negative for rash.   Neurological: Positive for weakness. Negative for dizziness, headaches and paresthesias.   Psychiatric/Behavioral: Negative for mood changes. The patient is not nervous/anxious.      OBJECTIVE:     PHYSICAL EXAM:  General Appearance: In no acute distress  /84 (BP Location: Left arm, Patient Position: Sitting, Cuff Size: Adult Regular)   Pulse 60   Temp 98.6  F (37  C) (Tympanic)   Resp 18   Ht 1.892 m (6' 2.5\")   Wt 97.1 kg (214 lb)   SpO2 96%   BMI 27.11 kg/m    EYES: Clear, fundi are unremarkable  HEENT: nose and throat clear, ears normal   NECK:  without cervical or axillary adenopathy  RESPIRATORY: Clear  CARDIOVASCULAR: S1, S2  ABDOMEN: soft, flat, and non-tender, without mass, rebound, or guarding  EXTREMITIES: No joint swelling, no ulcer or edema  NEUROLOGIC: No focal arm or leg  weakness, speech is clear, gait is normal  PSYCHIATRIC: Oriented X 3,  behavior and affect normal, thinking is clear    ASSESSMENT / PLAN:     Delmar was seen today for wellness visit.    Diagnoses and all orders for this visit:    CHASE on " "CPAP  Possibly needs a new machine  -     Adult Sleep Eval & Management  Referral; Future    Need for hepatitis C screening test  -     Hepatitis C Screen Reflex to HCV RNA Quant and Genotype    Screen for colon cancer  -     Colonoscopy Screening  Referral; Future    Hyperlipidemia LDL goal <100  Ongoing statin therapy  -     CBC with platelets  -     Comprehensive metabolic panel  -     Lipid Profile (Chol, Trig, HDL, LDL calc)    Hip pain, left  This may be from lower lumbar spondylosis.  There is at least mild DJD left hip which may play a role. Will follow for now   -     XR Hip Left 2-3 Views; Future    Bilateral sensorineural hearing loss  -     Adult Audiology  Referral    Shortness of breath  Episodes that suggest the possibility of angina equivalent. Will get stress test.    -     Echocardiogram Exercise Stress; Future    Immunization  I recommended TDAP at pharmacy.  He is pretty sure he had shingrix, he will find out.     COUNSELING:  Reviewed preventive health counseling, as reflected in patient instructions       Regular exercise       Healthy diet/nutrition       Colon cancer screening       Prostate cancer screening    BMI:   Estimated body mass index is 27.11 kg/m  as calculated from the following:    Height as of this encounter: 1.892 m (6' 2.5\").    Weight as of this encounter: 97.1 kg (214 lb).   Weight management plan: Discussed healthy diet and exercise guidelines    He reports that he has been smoking cigars. He has never used smokeless tobacco.  Nicotine/Tobacco Cessation Plan:   Appropriate preventive services were discussed with this patient, including applicable screening as appropriate for cardiovascular disease, diabetes, osteopenia/osteoporosis, and glaucoma.  As appropriate for age/gender, discussed screening for colorectal cancer, prostate cancer, breast cancer, and cervical cancer. Checklist reviewing preventive services available has been given to the " patient.    Reviewed patients plan of care and provided an AVS. The Basic Care Plan (routine screening as documented in Health Maintenance) for Delmar meets the Care Plan requirement. This Care Plan has been established and reviewed with the Patient.  Ryan Byrd MD  [facility]    Identified Health Risks:    I have reviewed Opioid Use Disorder and Substance Use Disorder risk factors and made any needed referrals.     Obstructive sleep apnea    Hyperlipidemia

## 2023-05-31 ENCOUNTER — OFFICE VISIT (OUTPATIENT)
Dept: SLEEP MEDICINE | Facility: CLINIC | Age: 73
End: 2023-05-31
Attending: INTERNAL MEDICINE
Payer: COMMERCIAL

## 2023-05-31 VITALS
OXYGEN SATURATION: 96 % | DIASTOLIC BLOOD PRESSURE: 93 MMHG | WEIGHT: 213.8 LBS | HEIGHT: 75 IN | BODY MASS INDEX: 26.58 KG/M2 | SYSTOLIC BLOOD PRESSURE: 165 MMHG | HEART RATE: 56 BPM

## 2023-05-31 DIAGNOSIS — G47.33 OSA ON CPAP: Primary | ICD-10-CM

## 2023-05-31 PROCEDURE — 99203 OFFICE O/P NEW LOW 30 MIN: CPT | Performed by: INTERNAL MEDICINE

## 2023-05-31 ASSESSMENT — SLEEP AND FATIGUE QUESTIONNAIRES
HOW LIKELY ARE YOU TO NOD OFF OR FALL ASLEEP WHILE LYING DOWN TO REST IN THE AFTERNOON WHEN CIRCUMSTANCES PERMIT: MODERATE CHANCE OF DOZING
HOW LIKELY ARE YOU TO NOD OFF OR FALL ASLEEP WHEN YOU ARE A PASSENGER IN A CAR FOR AN HOUR WITHOUT A BREAK: SLIGHT CHANCE OF DOZING
HOW LIKELY ARE YOU TO NOD OFF OR FALL ASLEEP WHILE SITTING AND TALKING TO SOMEONE: WOULD NEVER DOZE
HOW LIKELY ARE YOU TO NOD OFF OR FALL ASLEEP WHILE WATCHING TV: SLIGHT CHANCE OF DOZING
HOW LIKELY ARE YOU TO NOD OFF OR FALL ASLEEP IN A CAR, WHILE STOPPED FOR A FEW MINUTES IN TRAFFIC: WOULD NEVER DOZE
HOW LIKELY ARE YOU TO NOD OFF OR FALL ASLEEP WHILE SITTING INACTIVE IN A PUBLIC PLACE: WOULD NEVER DOZE
HOW LIKELY ARE YOU TO NOD OFF OR FALL ASLEEP WHILE SITTING QUIETLY AFTER LUNCH WITHOUT ALCOHOL: WOULD NEVER DOZE
HOW LIKELY ARE YOU TO NOD OFF OR FALL ASLEEP WHILE SITTING AND READING: SLIGHT CHANCE OF DOZING

## 2023-05-31 NOTE — PROGRESS NOTES
Additional 15 minutes on the date of service was spent performing the following:    -Preparing to see the patient  -Obtaining and/or reviewing separately obtained history   -Ordering medications, tests, or procedures   -Documenting clinical information in the electronic or other health record     Thank you for the opportunity to participate in the care of Delmar Moreno.     He is a 72 year old y/o male patient who comes to the sleep medicine clinic for follow up.  The patient was diagnosed with obstructive sleep apnea at our facility on 06/07/2019 (AHI = 8.8).  The patient states that he continues to benefit from CPAP therapy and would like to reestablish care so that he can get his pressure adjusted and to get a prescription to get new supplies.     Assessment and Plan:  In summary Delmar Moreno is a 72 year old year old male who is here for follow up.    1. CHASE on CPAP  I congratulated the patient on his excellent CPAP usage.  I will narrow his pressure setting to a set pressure of 10 CWP.  Return to clinic annually.  - Adult Sleep Eval & Management  Referral     Compliance Download data for 30 days:  Pressure setting: APAP 5-15 CWP  95% pressure: 9.7 CWP  Residual AHI: 0.2 events per hour  Leak: Minimal  Compliance: 100%  Mask Tolerance: Good  Skin irritation: None  DME: [facility]    Lab reviewed: Discussed with patient.      Sleep-Wake Cycle:    TIME IN BED:    1) Work/School Days:    Do you work or go to school? No   What time do you usually get into bed? 10:30   About how long does it take you to fall asleep? 5_10 minutes   How often do you have trouble falling asleep? maybe once a week   How often do you wake up during the night? every night   Do you work days/evenings/nights/rotating shifts?    What wakes you up at night? Other   Please elaborate: increased air from or adjustment to CPAP   How often do you have trouble falling back to sleep? 7   About how long does it take to fall back  "to sleep? 5_10 minutes   What do you usually do if you have trouble getting back to sleep? nothing   What time do you usually get out of bed to start your day? 7-8 AM   Do you use an alarm? No   2) Weekends/Non-work Days/All Other Days    What time do you usually get into bed? 10:30   About how long does it take you to fall asleep? 5-10 minutes   What time do you usually get out of bed to start your day? 7-8 AM   Do you use an alarm? No   SLEEP NEED    On average, about how much sleep do you think you get? 7 hours   About how much sleep do you think you need? 8 hours   SLEEP POSITION    Which sleep positions do you prefer? Side   Do you do any of the following activities in bed?    How often do you take a nap on purpose?    About how long are your naps? none   Do you feel better after naps?    How often do you doze off unintentionally? four if watching tv in the afternoon   Have you ever had a driving accident or near-miss due to sleepiness/drowsiness? No         BROCK:  BROCK Total Score: 9  Total score - [place]: 5 (5/31/2023 11:00 AM)        Patient Active Problem List   Diagnosis     Cervical spondylosis without myelopathy     CHASE on CPAP     Hyperlipidemia LDL goal <100       Past Medical History:   Diagnosis Date     History of arm fracture      History of colonic polyps      Hyperlipidemia LDL goal <100      Obstructive sleep apnea syndrome        Past Surgical History:   Procedure Laterality Date     FRACTURE SURGERY       NO PAST SURGERIES         Current Outpatient Medications   Medication Sig Dispense Refill     atorvastatin (LIPITOR) 40 MG tablet Take 1 tablet (40 mg) by mouth daily 90 tablet 0       No Known Allergies    Physical Exam:  BP (!) 165/93   Pulse 56   Ht 1.892 m (6' 2.5\")   Wt 97 kg (213 lb 12.8 oz)   SpO2 96%   BMI 27.08 kg/m    BMI:Body mass index is 27.08 kg/m .   GEN: NAD,   Head: Normocephalic.  EYES: EOMI  Psych: normal mood, normal affect  The patient declined any other physical " exam.    Labs/Studies:      No results found for: PH, PHARTERIAL, PO2, [id number], SAT, PCO2, HCO3, BASEEXCESS, ARABELLA, BEB  No results found for: TSH  Lab Results   Component Value Date    GLC 97 05/25/2023    GLC 90 08/06/2018     Lab Results   Component Value Date    HGB 15.2 05/25/2023    HGB 15.7 03/13/2018     Lab Results   Component Value Date    BUN 13.2 05/25/2023    BUN 11 08/06/2018    CR 1.04 05/25/2023    CR 0.94 08/06/2018     Lab Results   Component Value Date    AST 25 05/25/2023    AST 20 08/06/2018    ALT 23 05/25/2023    ALT 25 08/06/2018    ALKPHOS 87 05/25/2023    ALKPHOS 67 08/06/2018    BILITOTAL 0.7 05/25/2023    BILITOTAL 1.2 (H) 08/06/2018     No results found for: UAMP, UBARB, BENZODIAZEUR, UCANN, UCOC, OPIT, UPCP    Recent Labs   Lab Test 05/25/23  1103 08/06/18  1116    138   POTASSIUM 4.6 4.2   CHLORIDE 103 103   CO2 23 26   ANIONGAP 13 9   GLC 97 90   BUN 13.2 11   CR 1.04 0.94   KELVIN 9.5 9.4       No results found for: DEBRA    I reviewed the efficacy and compliance report from his device. Data summarized on the HPI and the PAP compliance flow sheet.     Patient verbalized understanding of these issues, agrees with the plan and all questions were answered today. Patient was given an opportuntity to voice any other symptoms or concerns not listed above. Patient did not have any other symptoms or concerns.      Leo Mckinley DO  Board Certified in Internal Medicine and Sleep Medicine    (Note created with Dragon voice recognition and unintended spelling errors and word substitutions may occur)     Audio and visual devices were used for this virtual clinic visit with permission from patient.

## 2023-05-31 NOTE — NURSING NOTE
"Chief Complaint   Patient presents with     Sleep Problem     Hx of sleep apnea        Initial BP (!) 165/93   Pulse 56   Ht 1.892 m (6' 2.5\")   Wt 97 kg (213 lb 12.8 oz)   SpO2 96%   BMI 27.08 kg/m   Estimated body mass index is 27.08 kg/m  as calculated from the following:    Height as of this encounter: 1.892 m (6' 2.5\").    Weight as of this encounter: 97 kg (213 lb 12.8 oz).    Medication Reconciliation: complete    Neck circumference:     DME: [facility]     Future MyChart message sent reminding patient of 1 year follow up appointment.     Massiel Ramos MA    "

## 2023-06-05 NOTE — PROGRESS NOTES
AUDIOLOGY REPORT    SUBJECTIVE:  Delmar Moreno is a 72 year old male who was seen in the Audiology Clinic at the [facility] for audiologic evaluation, referred by Ryan Byrd M.D. The patient reports trouble hearing over the past year or two, particularly in noisy places. His family has complaints about how he is hearing, which prompted him to get this hearing test. The patient has occasional bilateral buzzing tinnitus that is not bothersome. He has had a few episodes of feeling off balance over the past year, where he is still able to move and get around, but feels that something is off. He denies true vertigo. There is a family history of hearing loss in the patient's parents, who both wore hearing aids with age. He denies ear pain, pressure, drainage, ear surgery, or noise exposure.    OBJECTIVE:  Abuse Screening:  Do you feel unsafe at home or work/school? No  Do you feel threatened by someone? No  Does anyone try to keep you from having contact with others, or doing things outside of your home? No  Physical signs of abuse present? No     Fall Risk Screen:  1. Have you fallen two or more times in the past year? No  2. Have you fallen and had an injury in the past year? No    Otoscopic exam indicates clear ear canal right and non-occluding cerumen left. Some soft wax in the outer portion of the ear canal was removed via curette without incident. There was remaining non-occluding wax deeper in the canal that was not removed today.    Pure Tone Thresholds assessed using conventional audiometry with good reliability from 250-8000 Hz bilaterally using insert earphones and circumaural headphones     RIGHT:  Normal hearing with a dip to mild loss at 1000 Hz rising to normal hearing through 3000 Hz, sloping to a mild to severe high frequency sensorineural hearing loss    LEFT:   Normal hearing through 2000 Hz sloping to a mild to severe high frequency sensorineural hearing  loss    Tympanogram:    RIGHT: Hypermobile    LEFT:  Slightly hypermobile    Reflexes (reported by stimulus ear):  Could not test due to equipment limitations.    Speech Reception Threshold:    RIGHT: 15 dB HL    LEFT:   15 dB HL  Word Recognition Score:     RIGHT: 92% at 60 dB HL using NU-6 recorded word list.    LEFT:   100% at 60 dB HL using NU-6 recorded word list.      ASSESSMENT:   Bilateral mild to severe high frequency (above 3000 Hz) sensorineural hearing loss.Today s results were discussed with the patient in detail.     PLAN:  Patient was counseled regarding hearing loss and impact on communication. Patient is a borderline candidate for amplification at this time. It is recommended that the patient return for repeat audiologic evaluation in one year, or sooner should he begin to experience an increase in difficulty hearing. The patient will also call his primary care provider regarding cerumen management for the left ear. Please call this clinic with questions regarding these results or recommendations.      Naga Wilhelm, CCC-A  Doctor of Audiology  [place] Licensed Audiologist #[id number]

## 2023-06-08 DIAGNOSIS — Z12.11 SCREENING FOR COLON CANCER: Primary | ICD-10-CM

## 2023-06-12 ENCOUNTER — DOCUMENTATION ONLY (OUTPATIENT)
Dept: SLEEP MEDICINE | Facility: CLINIC | Age: 73
End: 2023-06-12
Payer: COMMERCIAL

## 2023-06-12 NOTE — PROGRESS NOTES
Veronica   6/12/2023- JL APPT SCHEDULE ON 6/29/2023 IN [facility]. TOLD TO BRING MACHINE FOR MANUAL DOWNLOAD NEEDED PRIOR AUTH PRIOR TO GIVING HIM SUPPLIES.

## 2023-06-20 DIAGNOSIS — Z12.11 SCREENING FOR COLON CANCER: Primary | ICD-10-CM

## 2023-06-21 ENCOUNTER — HOSPITAL ENCOUNTER (OUTPATIENT)
Dept: CARDIOLOGY | Facility: HOSPITAL | Age: 73
Discharge: HOME | End: 2023-06-21
Attending: INTERNAL MEDICINE | Admitting: INTERNAL MEDICINE
Payer: COMMERCIAL

## 2023-06-21 DIAGNOSIS — R06.02 SHORTNESS OF BREATH: ICD-10-CM

## 2023-06-21 PROCEDURE — 93016 CV STRESS TEST SUPVJ ONLY: CPT | Performed by: INTERNAL MEDICINE

## 2023-06-21 PROCEDURE — 93321 DOPPLER ECHO F-UP/LMTD STD: CPT | Mod: 26 | Performed by: INTERNAL MEDICINE

## 2023-06-21 PROCEDURE — 93325 DOPPLER ECHO COLOR FLOW MAPG: CPT | Mod: 26 | Performed by: INTERNAL MEDICINE

## 2023-06-21 PROCEDURE — 93018 CV STRESS TEST I&R ONLY: CPT | Performed by: INTERNAL MEDICINE

## 2023-06-21 PROCEDURE — 93321 DOPPLER ECHO F-UP/LMTD STD: CPT | Mod: TC

## 2023-06-21 PROCEDURE — 93350 STRESS TTE ONLY: CPT | Mod: 26 | Performed by: INTERNAL MEDICINE

## 2023-06-21 PROCEDURE — 93325 DOPPLER ECHO COLOR FLOW MAPG: CPT | Mod: TC

## 2023-06-22 ENCOUNTER — OFFICE VISIT (OUTPATIENT)
Dept: AUDIOLOGY | Facility: CLINIC | Age: 73
End: 2023-06-22
Payer: COMMERCIAL

## 2023-06-22 DIAGNOSIS — R03.0 ELEVATED BLOOD PRESSURE READING WITHOUT DIAGNOSIS OF HYPERTENSION: Primary | ICD-10-CM

## 2023-06-22 DIAGNOSIS — H90.3 BILATERAL SENSORINEURAL HEARING LOSS: ICD-10-CM

## 2023-06-22 PROCEDURE — 92557 COMPREHENSIVE HEARING TEST: CPT | Performed by: AUDIOLOGIST

## 2023-06-22 PROCEDURE — 92567 TYMPANOMETRY: CPT | Performed by: AUDIOLOGIST

## 2023-06-22 RX ORDER — LISINOPRIL 10 MG/1
10 TABLET ORAL DAILY
Qty: 90 TABLET | Refills: 3 | Status: SHIPPED | OUTPATIENT
Start: 2023-06-22 | End: 2024-06-10

## 2023-06-22 NOTE — PROGRESS NOTES
Discussed his stress test and the negative findings, as well as hypertension with exertion.  He agrees to start lisinopril 10 mg po daily and follow up in 3-4 months.  bp has been 'creeping' up in recent months, and he likely has early hypertension. Dr. Carson MD

## 2023-08-07 DIAGNOSIS — E78.2 MIXED HYPERLIPIDEMIA: ICD-10-CM

## 2023-08-07 RX ORDER — ATORVASTATIN CALCIUM 40 MG/1
40 TABLET, FILM COATED ORAL DAILY
Qty: 90 TABLET | Refills: 2 | Status: SHIPPED | OUTPATIENT
Start: 2023-08-07 | End: 2024-05-06

## 2023-08-07 NOTE — TELEPHONE ENCOUNTER
"Last Written Prescription Date:  5/8/2023  Last Fill Quantity: 90,  # refills: 0   Last office visit provider:  5/25/2023     Requested Prescriptions   Pending Prescriptions Disp Refills    atorvastatin (LIPITOR) 40 MG tablet [Pharmacy Med Name: ATORVASTATIN 40 MG TABLET] 90 tablet 0     Sig: TAKE 1 TABLET BY MOUTH EVERY DAY       Statins Protocol Passed - 8/7/2023  6:52 AM        Passed - LDL on file in past 12 months     Recent Labs   Lab Test 05/25/23  1103   LDL 94             Passed - No abnormal creatine kinase in past 12 months     No lab results found.             Passed - Recent (12 mo) or future (30 days) visit within the authorizing provider's specialty     Patient has had an office visit with the authorizing provider or a provider within the authorizing providers department within the previous 12 mos or has a future within next 30 days. See \"Patient Info\" tab in inbasket, or \"Choose Columns\" in Meds & Orders section of the refill encounter.              Passed - Medication is active on med list        Passed - Patient is age 18 or older             Fidelina Adkins RN 08/07/23 6:52 AM  "

## 2023-08-09 ENCOUNTER — MYC MEDICAL ADVICE (OUTPATIENT)
Dept: INTERNAL MEDICINE | Facility: CLINIC | Age: 73
End: 2023-08-09
Payer: COMMERCIAL

## 2023-08-09 ENCOUNTER — TELEPHONE (OUTPATIENT)
Dept: GASTROENTEROLOGY | Facility: CLINIC | Age: 73
End: 2023-08-09
Payer: COMMERCIAL

## 2023-08-09 NOTE — TELEPHONE ENCOUNTER
"Endoscopy Scheduling Screen    Have you had a positive Covid test in the last 14 days?  No    Are you active on MyChart?   Yes    What insurance is in the chart?  BC/BS: Schedule in [facility] unless patient meets exclusion criteria.   CHASE  medicare  Ordering/Referring Provider: MCKAY ROGERS   (If ordering provider performs procedure, schedule with ordering provider unless otherwise instructed. )    BMI: Estimated body mass index is 27.08 kg/m  as calculated from the following:    Height as of 5/31/23: 1.892 m (6' 2.5\").    Weight as of 5/31/23: 97 kg (213 lb 12.8 oz).       Sedation Ordered  moderate sedation.   If patient BMI > 50 do not schedule in [facility].    Are you taking any prescription medications for pain?   No    Are you taking methadone or Suboxone?  No      Do you have a history of malignant hyperthermia or adverse reaction to anesthesia?  No      (Females) Are you currently pregnant?   No       Have you been diagnosed or told you have pulmonary hypertension?   No      Do you have an LVAD?  No      Have you been told you have moderate to severe sleep apnea?  Yes (RN Review required for scheduling unless scheduling in Hospital.)  USES CPAP      Have you been told you have COPD, asthma, or any other lung disease?  No      Do you have any heart conditions?  No       Have you ever had or are you awaiting a heart or lung transplant?   No      Have you had a stroke or transient ischemic attack (TIA aka \"mini stroke\" in the last 6 months?   No      Have you been diagnosed with or been told you have cirrhosis of the liver?   No      Are you currently on dialysis?   No      Do you need assistance transferring?   No    BMI: Estimated body mass index is 27.08 kg/m  as calculated from the following:    Height as of 5/31/23: 1.892 m (6' 2.5\").    Weight as of 5/31/23: 97 kg (213 lb 12.8 oz).     Is patients BMI > 40 and scheduling location [facility]?  No      Do you take the medication Phentermine, Ozempic or Wegovy?  No      Do " you take the medication Naltrexone?  No      Do you take blood thinners?  No      Prep   Are you currently on dialysis or do you have chronic kidney disease?  No      Do you have a diagnosis of diabetes?  No      Do you have a diagnosis of cystic fibrosis (CF)?  No      On a regular basis do you go 3 -5 days between bowel movements?  No      BMI > 40?  No    Preferred Pharmacy:    [facility]/pharmacy #[id number] - [place], [place] - [address]  [address] [place] [address]  Phone: [phone] Fax: [phone]      Final Scheduling Details   Colonoscopy prep sent?  MiraLAX (No Mag Citrate)    Procedure scheduled  Colonoscopy    Surgeon:  NIKITA DOMINGUEZ     Date of procedure:  10/12     Schedule PAC:   No    Location  [facility]    Sedation   Moderate Sedation    Patient Reminders:   You will receive a call from a Nurse to review instructions and health history.  This assessment must be completed prior to your procedure.  Failure to complete the Nurse assessment may result in the procedure being cancelled.      On the day of your procedure, please designate an adult(s) who can drive you home stay with you for the next 24 hours. The medicines used in the exam will make you sleepy. You will not be able to drive.      You cannot take public transportation, ride share services, or non-medical taxi service without a responsible caregiver.  Medical transport services are allowed with the requirement that a responsible caregiver will receive you at your destination.  We require that drivers and caregivers are confirmed prior to your procedure.

## 2023-09-29 ENCOUNTER — TELEPHONE (OUTPATIENT)
Dept: GASTROENTEROLOGY | Facility: CLINIC | Age: 73
End: 2023-09-29
Payer: COMMERCIAL

## 2023-09-29 NOTE — TELEPHONE ENCOUNTER
Attempted to contact patient in order to complete pre assessment questions.     No answer. Left message to return call to [phone] option 4      Procedure details:    Patient scheduled for Colonoscopy  on 10/12/23.     Arrival time: 0730. Procedure time 0830    Pre op exam needed? N/A    Facility location: [facility]; [address], [address], [place] [address]    Sedation type: Conscious sedation     Indication for procedure: Screening      Chart review:     Electronic implanted devices? No    Diabetic? No    Diabetic medication HOLDING recommendations: (if applicable)  Oral diabetic medications: N/A  Diabetic injectables: N/A  Insulin: N/A      Medication review:    Anticoagulants? No    NSAIDS? No NSAID medications per patient's medication list.  RN will verify with pre-assessment call.    Other medication HOLDING recommendations:  N/A      Prep for procedure:     Bowel prep recommendation: Miralax without magnesium citrate  Due to: standard prep due to recall.    Prep instructions sent via [employer].       Daya Avina RN  Endoscopy Procedure Pre Assessment RN          (0) swallows foods and liquids w/o difficulty

## 2023-10-02 NOTE — TELEPHONE ENCOUNTER
Pre assessment completed for upcoming procedure.   (Please see previous telephone encounter notes for complete details)    Patient  returned call.       Procedure details:    Arrival time and facility location reviewed.    Pre op exam needed? N/A    Designated  policy reviewed. Instructed to have someone stay 6 hours post procedure.     COVID policy reviewed.      Medication review:    Medications reviewed. Please see supporting documentation below. Holding recommendations discussed (if applicable).       Prep for procedure:     Procedure prep instructions reviewed.        Additional information needed?  N/A      Patient  verbalized understanding and had no questions or concerns at this time.      Nia Mclain RN  Endoscopy Procedure Pre Assessment RN  [phone] option 4

## 2023-10-12 ENCOUNTER — HOSPITAL ENCOUNTER (OUTPATIENT)
Facility: CLINIC | Age: 73
Discharge: HOME | End: 2023-10-12
Attending: INTERNAL MEDICINE | Admitting: INTERNAL MEDICINE
Payer: COMMERCIAL

## 2023-10-12 VITALS
HEART RATE: 67 BPM | DIASTOLIC BLOOD PRESSURE: 78 MMHG | SYSTOLIC BLOOD PRESSURE: 112 MMHG | OXYGEN SATURATION: 98 % | RESPIRATION RATE: 16 BRPM

## 2023-10-12 LAB — COLONOSCOPY: NORMAL

## 2023-10-12 PROCEDURE — 99153 MOD SED SAME PHYS/QHP EA: CPT | Mod: PT

## 2023-10-12 PROCEDURE — 45385 COLONOSCOPY W/LESION REMOVAL: CPT | Mod: PT | Performed by: INTERNAL MEDICINE

## 2023-10-12 PROCEDURE — 250N000011 HC RX IP 250 OP 636: Performed by: INTERNAL MEDICINE

## 2023-10-12 PROCEDURE — 45378 DIAGNOSTIC COLONOSCOPY: CPT | Performed by: INTERNAL MEDICINE

## 2023-10-12 PROCEDURE — 88305 TISSUE EXAM BY PATHOLOGIST: CPT | Mod: 26 | Performed by: PATHOLOGY

## 2023-10-12 PROCEDURE — G0500 MOD SEDAT ENDO SERVICE >5YRS: HCPCS | Mod: PT | Performed by: INTERNAL MEDICINE

## 2023-10-12 PROCEDURE — 88305 TISSUE EXAM BY PATHOLOGIST: CPT | Mod: TC | Performed by: INTERNAL MEDICINE

## 2023-10-12 RX ORDER — ONDANSETRON 2 MG/ML
4 INJECTION INTRAMUSCULAR; INTRAVENOUS EVERY 6 HOURS PRN
Status: DISCONTINUED | OUTPATIENT
Start: 2023-10-12 | End: 2023-10-12 | Stop reason: HOSPADM

## 2023-10-12 RX ORDER — NALOXONE HYDROCHLORIDE 0.4 MG/ML
0.2 INJECTION, SOLUTION INTRAMUSCULAR; INTRAVENOUS; SUBCUTANEOUS
Status: DISCONTINUED | OUTPATIENT
Start: 2023-10-12 | End: 2023-10-12 | Stop reason: HOSPADM

## 2023-10-12 RX ORDER — FENTANYL CITRATE 50 UG/ML
INJECTION, SOLUTION INTRAMUSCULAR; INTRAVENOUS PRN
Status: DISCONTINUED | OUTPATIENT
Start: 2023-10-12 | End: 2023-10-12 | Stop reason: HOSPADM

## 2023-10-12 RX ORDER — ONDANSETRON 2 MG/ML
4 INJECTION INTRAMUSCULAR; INTRAVENOUS EVERY 6 HOURS PRN
Status: CANCELLED | OUTPATIENT
Start: 2023-10-12

## 2023-10-12 RX ORDER — NALOXONE HYDROCHLORIDE 0.4 MG/ML
0.4 INJECTION, SOLUTION INTRAMUSCULAR; INTRAVENOUS; SUBCUTANEOUS
Status: DISCONTINUED | OUTPATIENT
Start: 2023-10-12 | End: 2023-10-12 | Stop reason: HOSPADM

## 2023-10-12 RX ORDER — NALOXONE HYDROCHLORIDE 0.4 MG/ML
0.4 INJECTION, SOLUTION INTRAMUSCULAR; INTRAVENOUS; SUBCUTANEOUS
Status: CANCELLED | OUTPATIENT
Start: 2023-10-12

## 2023-10-12 RX ORDER — PROCHLORPERAZINE MALEATE 5 MG
5 TABLET ORAL EVERY 6 HOURS PRN
Status: CANCELLED | OUTPATIENT
Start: 2023-10-12

## 2023-10-12 RX ORDER — ONDANSETRON 2 MG/ML
4 INJECTION INTRAMUSCULAR; INTRAVENOUS
Status: ACTIVE | OUTPATIENT
Start: 2023-10-12

## 2023-10-12 RX ORDER — ONDANSETRON 2 MG/ML
4 INJECTION INTRAMUSCULAR; INTRAVENOUS
Status: DISCONTINUED | OUTPATIENT
Start: 2023-10-12 | End: 2023-10-12 | Stop reason: HOSPADM

## 2023-10-12 RX ORDER — NALOXONE HYDROCHLORIDE 0.4 MG/ML
0.2 INJECTION, SOLUTION INTRAMUSCULAR; INTRAVENOUS; SUBCUTANEOUS
Status: CANCELLED | OUTPATIENT
Start: 2023-10-12

## 2023-10-12 RX ORDER — ASPIRIN 81 MG/1
81 TABLET ORAL DAILY
COMMUNITY

## 2023-10-12 RX ORDER — LIDOCAINE 40 MG/G
CREAM TOPICAL
Status: DISCONTINUED | OUTPATIENT
Start: 2023-10-12 | End: 2023-10-12 | Stop reason: HOSPADM

## 2023-10-12 RX ORDER — ONDANSETRON 4 MG/1
4 TABLET, ORALLY DISINTEGRATING ORAL EVERY 6 HOURS PRN
Status: DISCONTINUED | OUTPATIENT
Start: 2023-10-12 | End: 2023-10-12 | Stop reason: HOSPADM

## 2023-10-12 RX ORDER — ONDANSETRON 4 MG/1
4 TABLET, ORALLY DISINTEGRATING ORAL EVERY 6 HOURS PRN
Status: CANCELLED | OUTPATIENT
Start: 2023-10-12

## 2023-10-12 RX ORDER — LIDOCAINE 40 MG/G
CREAM TOPICAL
Status: ACTIVE | OUTPATIENT
Start: 2023-10-12

## 2023-10-12 RX ORDER — FLUMAZENIL 0.1 MG/ML
0.2 INJECTION, SOLUTION INTRAVENOUS
Status: CANCELLED | OUTPATIENT
Start: 2023-10-12 | End: 2023-10-13

## 2023-10-12 RX ORDER — FLUMAZENIL 0.1 MG/ML
0.2 INJECTION, SOLUTION INTRAVENOUS
Status: DISCONTINUED | OUTPATIENT
Start: 2023-10-12 | End: 2023-10-12 | Stop reason: HOSPADM

## 2023-10-12 RX ORDER — PROCHLORPERAZINE MALEATE 5 MG
5 TABLET ORAL EVERY 6 HOURS PRN
Status: DISCONTINUED | OUTPATIENT
Start: 2023-10-12 | End: 2023-10-12 | Stop reason: HOSPADM

## 2023-10-12 ASSESSMENT — ACTIVITIES OF DAILY LIVING (ADL)
ADLS_ACUITY_SCORE: 35
ADLS_ACUITY_SCORE: 35

## 2023-10-12 NOTE — H&P
Delmar Moreno  [id number]  male  72 year old      Reason for procedure/surgery:   screening colonoscopy      Patient Active Problem List   Diagnosis    Cervical spondylosis without myelopathy    CHASE on CPAP    Hyperlipidemia LDL goal <100       Past Surgical History:    Past Surgical History:   Procedure Laterality Date    FRACTURE SURGERY         Past Medical History:   Past Medical History:   Diagnosis Date    History of arm fracture     History of colonic polyps     Hyperlipidemia LDL goal <100     Obstructive sleep apnea syndrome        Social History:   Social History     Tobacco Use    Smoking status: Every Day     Types: Cigars    Smokeless tobacco: Never   Substance Use Topics    Alcohol use: Yes     Alcohol/week: 8.0 standard drinks of alcohol     Types: 8 Standard drinks or equivalent per week     Comment: 2/day       Family History:   Family History   Problem Relation Age of Onset    Coronary Artery Disease Father     Breast Cancer Sister     Coronary Artery Disease Brother     Sleep Apnea Brother     Bladder Cancer Brother     Bladder Cancer Brother        Allergies: No Known Allergies    Active Medications:   Current Outpatient Medications   Medication Sig Dispense Refill    aspirin 81 MG EC tablet Take 81 mg by mouth daily      atorvastatin (LIPITOR) 40 MG tablet TAKE 1 TABLET BY MOUTH EVERY DAY 90 tablet 2    lisinopril (ZESTRIL) 10 MG tablet Take 1 tablet (10 mg) by mouth daily 90 tablet 3       Systemic Review:   CONSTITUTIONAL: NEGATIVE for fever, chills, change in weight  ENT/MOUTH: NEGATIVE for ear, mouth and throat problems  RESP: NEGATIVE for significant cough or SOB  CV: NEGATIVE for chest pain, palpitations or peripheral edema    Physical Examination:   Vital Signs: /78   Pulse 67   Resp 16   SpO2 98%   GENERAL: healthy, alert and no distress  NECK: no adenopathy, no asymmetry, masses, or scars  RESP: lungs clear to auscultation - no rales, rhonchi or wheezes  CV: regular rate and  rhythm, normal S1 S2, no S3 or S4, no murmur, click or rub, no peripheral edema and peripheral pulses strong  ABDOMEN: soft, nontender, no hepatosplenomegaly, no masses and bowel sounds normal  MS: no gross musculoskeletal defects noted, no edema    ASA: 2    Mallampati Score: 2      Plan: Appropriate to proceed as scheduled.      Demario Oleary MD  10/12/2023    PCP:  Ryan Byrd

## 2023-10-13 LAB
PATH REPORT.COMMENTS IMP SPEC: NORMAL
PATH REPORT.COMMENTS IMP SPEC: NORMAL
PATH REPORT.FINAL DX SPEC: NORMAL
PATH REPORT.GROSS SPEC: NORMAL
PATH REPORT.MICROSCOPIC SPEC OTHER STN: NORMAL
PATH REPORT.RELEVANT HX SPEC: NORMAL
PHOTO IMAGE: NORMAL

## 2024-04-25 ENCOUNTER — PATIENT OUTREACH (OUTPATIENT)
Dept: CARE COORDINATION | Facility: CLINIC | Age: 74
End: 2024-04-25
Payer: COMMERCIAL

## 2024-05-06 DIAGNOSIS — E78.2 MIXED HYPERLIPIDEMIA: ICD-10-CM

## 2024-05-06 RX ORDER — ATORVASTATIN CALCIUM 40 MG/1
40 TABLET, FILM COATED ORAL DAILY
Qty: 90 TABLET | Refills: 0 | Status: SHIPPED | OUTPATIENT
Start: 2024-05-06 | End: 2024-07-25

## 2024-05-09 ENCOUNTER — PATIENT OUTREACH (OUTPATIENT)
Dept: CARE COORDINATION | Facility: CLINIC | Age: 74
End: 2024-05-09
Payer: COMMERCIAL

## 2024-06-10 DIAGNOSIS — R03.0 ELEVATED BLOOD PRESSURE READING WITHOUT DIAGNOSIS OF HYPERTENSION: ICD-10-CM

## 2024-06-11 RX ORDER — LISINOPRIL 10 MG/1
10 TABLET ORAL DAILY
Qty: 90 TABLET | Refills: 3 | Status: SHIPPED | OUTPATIENT
Start: 2024-06-11 | End: 2024-07-25

## 2024-07-20 SDOH — HEALTH STABILITY: PHYSICAL HEALTH: ON AVERAGE, HOW MANY MINUTES DO YOU ENGAGE IN EXERCISE AT THIS LEVEL?: 0 MIN

## 2024-07-20 SDOH — HEALTH STABILITY: PHYSICAL HEALTH: ON AVERAGE, HOW MANY DAYS PER WEEK DO YOU ENGAGE IN MODERATE TO STRENUOUS EXERCISE (LIKE A BRISK WALK)?: 0 DAYS

## 2024-07-20 ASSESSMENT — SOCIAL DETERMINANTS OF HEALTH (SDOH): HOW OFTEN DO YOU GET TOGETHER WITH FRIENDS OR RELATIVES?: TWICE A WEEK

## 2024-07-21 ENCOUNTER — HEALTH MAINTENANCE LETTER (OUTPATIENT)
Age: 74
End: 2024-07-21

## 2024-07-25 ENCOUNTER — OFFICE VISIT (OUTPATIENT)
Dept: INTERNAL MEDICINE | Facility: CLINIC | Age: 74
End: 2024-07-25
Payer: COMMERCIAL

## 2024-07-25 VITALS
DIASTOLIC BLOOD PRESSURE: 72 MMHG | HEART RATE: 55 BPM | HEIGHT: 74 IN | OXYGEN SATURATION: 97 % | RESPIRATION RATE: 15 BRPM | SYSTOLIC BLOOD PRESSURE: 119 MMHG | WEIGHT: 212.3 LBS | BODY MASS INDEX: 27.25 KG/M2 | TEMPERATURE: 97.1 F

## 2024-07-25 DIAGNOSIS — Z86.0100 HISTORY OF COLONIC POLYPS: ICD-10-CM

## 2024-07-25 DIAGNOSIS — R03.0 ELEVATED BLOOD PRESSURE READING WITHOUT DIAGNOSIS OF HYPERTENSION: ICD-10-CM

## 2024-07-25 DIAGNOSIS — E78.2 MIXED HYPERLIPIDEMIA: ICD-10-CM

## 2024-07-25 DIAGNOSIS — H61.23 BILATERAL IMPACTED CERUMEN: ICD-10-CM

## 2024-07-25 DIAGNOSIS — Z23 NEED FOR TDAP VACCINATION: ICD-10-CM

## 2024-07-25 DIAGNOSIS — Z23 NEED FOR SHINGLES VACCINE: ICD-10-CM

## 2024-07-25 DIAGNOSIS — G47.33 OSA ON CPAP: ICD-10-CM

## 2024-07-25 DIAGNOSIS — H90.3 BILATERAL SENSORINEURAL HEARING LOSS: ICD-10-CM

## 2024-07-25 DIAGNOSIS — E78.5 HYPERLIPIDEMIA LDL GOAL <100: Primary | ICD-10-CM

## 2024-07-25 LAB
ALBUMIN SERPL BCG-MCNC: 4.5 G/DL (ref 3.5–5.2)
ALP SERPL-CCNC: 74 U/L (ref 40–150)
ALT SERPL W P-5'-P-CCNC: 25 U/L (ref 0–70)
ANION GAP SERPL CALCULATED.3IONS-SCNC: 9 MMOL/L (ref 7–15)
AST SERPL W P-5'-P-CCNC: 23 U/L (ref 0–45)
BILIRUB SERPL-MCNC: 0.5 MG/DL
BUN SERPL-MCNC: 17.7 MG/DL (ref 8–23)
CALCIUM SERPL-MCNC: 9.3 MG/DL (ref 8.8–10.4)
CHLORIDE SERPL-SCNC: 102 MMOL/L (ref 98–107)
CHOLEST SERPL-MCNC: 144 MG/DL
CREAT SERPL-MCNC: 1.05 MG/DL (ref 0.67–1.17)
EGFRCR SERPLBLD CKD-EPI 2021: 74 ML/MIN/1.73M2
ERYTHROCYTE [DISTWIDTH] IN BLOOD BY AUTOMATED COUNT: 12.6 % (ref 10–15)
FASTING STATUS PATIENT QL REPORTED: NO
FASTING STATUS PATIENT QL REPORTED: NO
GLUCOSE SERPL-MCNC: 96 MG/DL (ref 70–99)
HCO3 SERPL-SCNC: 28 MMOL/L (ref 22–29)
HCT VFR BLD AUTO: 45.6 % (ref 40–53)
HDLC SERPL-MCNC: 37 MG/DL
HGB BLD-MCNC: 14.9 G/DL (ref 13.3–17.7)
LDLC SERPL CALC-MCNC: 83 MG/DL
MCH RBC QN AUTO: 31 PG (ref 26.5–33)
MCHC RBC AUTO-ENTMCNC: 32.7 G/DL (ref 31.5–36.5)
MCV RBC AUTO: 95 FL (ref 78–100)
NONHDLC SERPL-MCNC: 107 MG/DL
PLATELET # BLD AUTO: 210 10E3/UL (ref 150–450)
POTASSIUM SERPL-SCNC: 4.6 MMOL/L (ref 3.4–5.3)
PROT SERPL-MCNC: 7.4 G/DL (ref 6.4–8.3)
RBC # BLD AUTO: 4.8 10E6/UL (ref 4.4–5.9)
SODIUM SERPL-SCNC: 139 MMOL/L (ref 135–145)
TRIGL SERPL-MCNC: 120 MG/DL
WBC # BLD AUTO: 6.5 10E3/UL (ref 4–11)

## 2024-07-25 PROCEDURE — 85027 COMPLETE CBC AUTOMATED: CPT | Performed by: INTERNAL MEDICINE

## 2024-07-25 PROCEDURE — 36415 COLL VENOUS BLD VENIPUNCTURE: CPT | Performed by: INTERNAL MEDICINE

## 2024-07-25 PROCEDURE — 80053 COMPREHEN METABOLIC PANEL: CPT | Performed by: INTERNAL MEDICINE

## 2024-07-25 PROCEDURE — G0439 PPPS, SUBSEQ VISIT: HCPCS | Performed by: INTERNAL MEDICINE

## 2024-07-25 PROCEDURE — 99214 OFFICE O/P EST MOD 30 MIN: CPT | Mod: 25 | Performed by: INTERNAL MEDICINE

## 2024-07-25 PROCEDURE — 80061 LIPID PANEL: CPT | Performed by: INTERNAL MEDICINE

## 2024-07-25 PROCEDURE — 69209 REMOVE IMPACTED EAR WAX UNI: CPT | Mod: 50 | Performed by: INTERNAL MEDICINE

## 2024-07-25 RX ORDER — ATORVASTATIN CALCIUM 40 MG/1
40 TABLET, FILM COATED ORAL DAILY
Qty: 90 TABLET | Refills: 3 | Status: SHIPPED | OUTPATIENT
Start: 2024-07-25

## 2024-07-25 RX ORDER — LISINOPRIL 10 MG/1
10 TABLET ORAL DAILY
Qty: 90 TABLET | Refills: 3 | Status: SHIPPED | OUTPATIENT
Start: 2024-07-25

## 2024-07-25 ASSESSMENT — PAIN SCALES - GENERAL: PAINLEVEL: NO PAIN (0)

## 2024-07-25 NOTE — PROGRESS NOTES
"Preventive Care Visit  [facility]  Ryan Byrd MD, Internal Medicine  Jul 25, 2024    Assessment & Plan     Essential hypertension  Satisfactory control   - lisinopril (ZESTRIL) 10 MG tablet  Dispense: 90 tablet; Refill: 3    Need for shingles vaccine  Recommended and referred to pharmacy.     Need for Tdap vaccination  Recommended and referred to pharmacy    Hyperlipidemia LDL goal <100  - atorvastatin (LIPITOR) 40 MG tablet  Dispense: 90 tablet; Refill: 3  - Lipid panel reflex to direct LDL Non-fasting  - CBC with platelets  - Comprehensive metabolic panel    History of colonic polyps  Last colonoscopy 10/2023, due again in 10/2026    CHASE on CPAP  ongoing    Bilateral impacted cerumen  - REMOVE IMPACTED CERUMEN    Bilateral sensorineural hearing loss  Needs follow up evaluation of hearing loss.  Cerumen is addressed  - Adult Audiology  Referral    Nicotine/Tobacco Cessation  He reports that he has been smoking cigars. He has never used smokeless tobacco.    BMI  Estimated body mass index is 27.26 kg/m  as calculated from the following:    Height as of this encounter: 1.88 m (6' 2\").    Weight as of this encounter: 96.3 kg (212 lb 4.8 oz).     Counseling  Appropriate preventive services were addressed with this patient via screening, questionnaire, or discussion as appropriate for fall prevention, nutrition, physical activity, Tobacco-use cessation, weight loss and cognition.    Reviewed patient's diet, addressing concerns and/or questions.   We talked about exercise and fitness programs that are available.     Follow up one year and as needed.     Juancarlos Soto is a 73 year old, presenting for the following:  Recheck Medication and Physical        7/25/2024     8:02 AM   Additional Questions   Roomed by preston calixto     Health Care Directive  Patient does not have a Health Care Directive or Living Will: Discussed advance care planning with patient; information given to patient to " review.    HPI  He has been well. Alcohol use is mild/moderate - 2 drinks per day.  Weight is stable, exercise is modest.  No new issues of dyspnea or cough or any chest pain. Had colonoscopy which went well.  No voiding issues.          7/20/2024   General Health   How would you rate your overall physical health? Good   Feel stress (tense, anxious, or unable to sleep) Only a little      (!) STRESS CONCERN      7/20/2024   Nutrition   Diet: Regular (no restrictions)         7/20/2024   Exercise   Days per week of moderate/strenous exercise 0 days   Average minutes spent exercising at this level 0 min      (!) EXERCISE CONCERN      7/20/2024   Social Factors   Frequency of gathering with friends or relatives Twice a week   Worry food won't last until get money to buy more No   Food not last or not have enough money for food? No   Do you have housing? (Housing is defined as stable permanent housing and does not include staying ouside in a car, in a tent, in an abandoned building, in an overnight shelter, or couch-surfing.) Yes   Are you worried about losing your housing? No   Lack of transportation? No   Unable to get utilities (heat,electricity)? No          7/25/2024   Fall Risk   Gait Speed Test (Document in seconds) 4.29   Gait Speed Test Interpretation Less than or equal to 5.00 seconds - PASS             7/20/2024   Activities of Daily Living- Home Safety   Needs help with the following daily activites None of the above   Safety concerns in the home None of the above         7/20/2024   Dental   Dentist two times every year? Yes          7/20/2024   Hearing Screening   Hearing concerns? (!) I NEED TO ASK PEOPLE TO SPEAK UP OR REPEAT THEMSELVES.    (!) IT'S HARD TO FOLLOW A CONVERSATION IN A NOISY RESTAURANT OR CROWDED ROOM.    (!) TROUBLE UNDESTANDING A SPEAKER IN A PUBLIC MEETING OR [employer] SERVICE.    (!) TROUBLE UNDERSTANDING SOFT OR WHISPERED SPEECH.       Multiple values from one day are sorted in  reverse-chronological order         7/20/2024   Driving Risk Screening   Patient/family members have concerns about driving No          7/20/2024   General Alertness/Fatigue Screening   Have you been more tired than usual lately? No          7/20/2024   Urinary Incontinence Screening   Bothered by leaking urine in past 6 months No          7/20/2024   TB Screening   Were you born outside of the [place]? No     Today's PHQ-2 Score:       7/25/2024     7:58 AM   PHQ-2 ( 1999 [employer])   Q1: Little interest or pleasure in doing things 0   Q2: Feeling down, depressed or hopeless 0   PHQ-2 Score 0   Q1: Little interest or pleasure in doing things Not at all   Q2: Feeling down, depressed or hopeless Not at all   PHQ-2 Score 0         7/20/2024   Substance Use   Alcohol more than 3/day or more than 7/wk Yes   How often do you have a drink containing alcohol 4 or more times a week   How many alcohol drinks on typical day 1 or 2   How often do you have 5+ drinks at one occasion Never   Audit 2/3 Score 0   How often not able to stop drinking once started Never   How often failed to do what normally expected Never   How often needed first drink in am after a heavy drinking session Never   How often feeling of guilt or remorse after drinking Never   How often unable to remember what happened the night before Never   Have you or someone else been injured because of your drinking No   Has anyone been concerned or suggested you cut down on drinking No   TOTAL SCORE - AUDIT 4   Do you have a current opioid prescription? No   How severe/bad is pain from 1 to 10? 0/10 (No Pain)   Do you use any other substances recreationally? No      Social History     Tobacco Use    Smoking status: Every Day     Types: Cigars    Smokeless tobacco: Never   Vaping Use    Vaping status: Never Used   Substance Use Topics    Alcohol use: Yes     Alcohol/week: 8.0 standard drinks of alcohol     Types: 8 Standard drinks or equivalent per week     Comment: 2/day      ASCVD Risk   The 10-year ASCVD risk score (Adelia KEATING, et al., 2019) is: 23.7%    Values used to calculate the score:      Age: 73 years      Sex: Male      Is Non- : No      Diabetic: No      Tobacco smoker: Yes      Systolic Blood Pressure: 119 mmHg      Is BP treated: No      HDL Cholesterol: 36 mg/dL      Total Cholesterol: 159 mg/dL    Reviewed and updated as needed this visit by Provider    Past Medical History:   Diagnosis Date    History of arm fracture     History of colonic polyps     colonoscopy 10/2023 due in 10/2026    Hyperlipidemia LDL goal <100     Mitral stenosis     mild on stress echo 2023.    Obstructive sleep apnea syndrome     Primary osteoarthritis of left hip     mild     Past Surgical History:   Procedure Laterality Date    COLONOSCOPY N/A 10/12/2023    Procedure: Colonoscopy;  Surgeon: Demario Oleary MD;  Location:  GI    FRACTURE SURGERY       Current providers sharing in care for this patient include:  Patient Care Team:  Ryan Byrd MD as PCP - General (Internal Medicine)  Nia Hilliard AuD as Audiologist (Audiology)  Leo Mckinley DO as Assigned Sleep Provider    The following health maintenance items are reviewed in Epic and correct as of today:  Health Maintenance   Topic Date Due    ANNUAL REVIEW OF HM ORDERS  Never done    LUNG CANCER SCREENING  Never done    ZOSTER IMMUNIZATION (2 of 3) 04/04/2011    AORTIC ANEURYSM SCREENING (SYSTEM ASSIGNED)  Never done    DTAP/TDAP/TD IMMUNIZATION (2 - Td or Tdap) 02/07/2021    COVID-19 Vaccine (6 - 2023-24 season) 02/21/2024    NICOTINE/TOBACCO CESSATION COUNSELING Q 1 YR  05/25/2024    MEDICARE ANNUAL WELLNESS VISIT  05/25/2024    LIPID  05/25/2024    INFLUENZA VACCINE (1) 09/01/2024    FALL RISK ASSESSMENT  07/25/2025    GLUCOSE  05/25/2026    COLORECTAL CANCER SCREENING  10/12/2026    ADVANCE CARE PLANNING  05/25/2028    HEPATITIS C SCREENING  Completed    PHQ-2 (once per calendar  "year)  Completed    Pneumococcal Vaccine: 65+ Years  Completed    RSV VACCINE (Pregnancy & 60+)  Completed    IPV IMMUNIZATION  Aged Out    HPV IMMUNIZATION  Aged Out    MENINGITIS IMMUNIZATION  Aged Out    RSV MONOCLONAL ANTIBODY  Aged Out     Review of Systems  See HPI    Objective    PHYSICAL EXAM:  General Appearance: In no acute distress  Vitals:    07/25/24 0808   BP: 119/72   BP Location: Left arm   Patient Position: Sitting   Cuff Size: Adult Large   Pulse: 55   Resp: 15   Temp: 97.1  F (36.2  C)   SpO2: 97%   Weight: 96.3 kg (212 lb 4.8 oz)   Height: 1.88 m (6' 2\")     Estimated body mass index is 27.26 kg/m  as calculated from the following:    Height as of this encounter: 1.88 m (6' 2\").    Weight as of this encounter: 96.3 kg (212 lb 4.8 oz).  SKIN: no concerning lesions (he sees dermatology every few years)  EYES: Clear, fundi are unremarkable   HEENT: nose and throat clear, ears normal   NECK:  without cervical or axillary adenopathy  RESPIRATORY: Clear   CARDIOVASCULAR: S1, S2  ABDOMEN: soft, flat, and non-tender  EXTREMITIES:  no significant inflammation or edema  NEUROLOGIC: Speech is clear, gait is normal  PSYCHIATRIC: Oriented X 3, thinking is clear         7/25/2024   Mini Cog   Clock Draw Score 2 Normal   3 Item Recall 3 objects recalled   Mini Cog Total Score 5      Cognitive Function is intact.      Signed Electronically by: Ryan Byrd MD  "

## 2024-07-25 NOTE — LETTER
July 28, 2024      Charles Moreno  [address]  [place] [address]        Dear ,    We are writing to inform you of your test results.    Your tests are good.  The cholesterol levels are good. The blood counts and blood chemistries are normal.     Resulted Orders   Lipid panel reflex to direct LDL Non-fasting   Result Value Ref Range    Cholesterol 144 <200 mg/dL    Triglycerides 120 <150 mg/dL    Direct Measure HDL 37 (L) >=40 mg/dL    LDL Cholesterol Calculated 83 <=100 mg/dL    Non HDL Cholesterol 107 <130 mg/dL    Patient Fasting > 8hrs? No     Narrative    Cholesterol  Desirable:  <200 mg/dL    Triglycerides  Normal:  Less than 150 mg/dL  Borderline High:  150-199 mg/dL  High:  200-499 mg/dL  Very High:  Greater than or equal to 500 mg/dL    Direct Measure HDL  Female:  Greater than or equal to 50 mg/dL   Male:  Greater than or equal to 40 mg/dL    LDL Cholesterol  Desirable:  <100mg/dL  Above Desirable:  100-129 mg/dL   Borderline High:  130-159 mg/dL   High:  160-189 mg/dL   Very High:  >= 190 mg/dL    Non HDL Cholesterol  Desirable:  130 mg/dL  Above Desirable:  130-159 mg/dL  Borderline High:  160-189 mg/dL  High:  190-219 mg/dL  Very High:  Greater than or equal to 220 mg/dL   CBC with platelets   Result Value Ref Range    WBC Count 6.5 4.0 - 11.0 10e3/uL    RBC Count 4.80 4.40 - 5.90 10e6/uL    Hemoglobin 14.9 13.3 - 17.7 g/dL    Hematocrit 45.6 40.0 - 53.0 %    MCV 95 78 - 100 fL    MCH 31.0 26.5 - 33.0 pg    MCHC 32.7 31.5 - 36.5 g/dL    RDW 12.6 10.0 - 15.0 %    Platelet Count 210 150 - 450 10e3/uL   Comprehensive metabolic panel   Result Value Ref Range    Sodium 139 135 - 145 mmol/L    Potassium 4.6 3.4 - 5.3 mmol/L    Carbon Dioxide (CO2) 28 22 - 29 mmol/L    Anion Gap 9 7 - 15 mmol/L    Urea Nitrogen 17.7 8.0 - 23.0 mg/dL    Creatinine 1.05 0.67 - 1.17 mg/dL    GFR Estimate 74 >60 mL/min/1.73m2      Comment:      eGFR calculated using 2021 CKD-EPI equation.    Calcium 9.3 8.8 - 10.4  mg/dL      Comment:      Reference intervals for this test were updated on 7/16/2024 to reflect our healthy population more accurately. There may be differences in the flagging of prior results with similar values performed with this method. Those prior results can be interpreted in the context of the updated reference intervals.    Chloride 102 98 - 107 mmol/L    Glucose 96 70 - 99 mg/dL    Alkaline Phosphatase 74 40 - 150 U/L    AST 23 0 - 45 U/L    ALT 25 0 - 70 U/L    Protein Total 7.4 6.4 - 8.3 g/dL    Albumin 4.5 3.5 - 5.2 g/dL    Bilirubin Total 0.5 <=1.2 mg/dL    Patient Fasting > 8hrs? No        If you have any questions or concerns, please call the clinic at the number listed above.       Sincerely,      Ryan Byrd MD

## 2024-07-25 NOTE — PROGRESS NOTES
RN ear assessment completed prior to ear irrigation. Otoscopic exam reveals cerumen present and irrigation advised. Post Ear Irrigation exam completed and cerumen plug removed, tympanic membrane intact, and no bleeding noted.  Pain assessment completed.     Patient tolerated procedure:  yes

## 2024-07-26 ENCOUNTER — PATIENT OUTREACH (OUTPATIENT)
Dept: GASTROENTEROLOGY | Facility: CLINIC | Age: 74
End: 2024-07-26
Payer: COMMERCIAL

## 2024-09-04 ENCOUNTER — DOCUMENTATION ONLY (OUTPATIENT)
Dept: INTERNAL MEDICINE | Facility: CLINIC | Age: 74
End: 2024-09-04
Payer: COMMERCIAL

## 2024-09-04 DIAGNOSIS — G47.33 OSA ON CPAP: Primary | ICD-10-CM

## 2024-09-09 ENCOUNTER — TELEPHONE (OUTPATIENT)
Dept: SLEEP MEDICINE | Facility: CLINIC | Age: 74
End: 2024-09-09
Payer: COMMERCIAL

## 2024-09-09 NOTE — TELEPHONE ENCOUNTER
CHARMAINE received replacement CPAP RX, Notified patient that [facility] medicare advantage will require notes within the year stating compliance, benefit and reason why replacement is needing. Explained that getting notes stating compliance and benefit is most important then we can schedule a troubleshoot to look at device. Patient let me know device is functioning fine. Explained that due to many denials through [facility] medicare advantage if device is functioning (regardless if over 5 years) then prior authorization will not be approved. Patient understood and ordered supplies. Will get notes in the future

## 2024-09-27 ENCOUNTER — OFFICE VISIT (OUTPATIENT)
Dept: FAMILY MEDICINE | Facility: CLINIC | Age: 74
End: 2024-09-27
Payer: COMMERCIAL

## 2024-09-27 VITALS
SYSTOLIC BLOOD PRESSURE: 121 MMHG | HEIGHT: 74 IN | OXYGEN SATURATION: 96 % | WEIGHT: 211 LBS | HEART RATE: 61 BPM | TEMPERATURE: 97.8 F | RESPIRATION RATE: 15 BRPM | DIASTOLIC BLOOD PRESSURE: 60 MMHG | BODY MASS INDEX: 27.08 KG/M2

## 2024-09-27 DIAGNOSIS — J20.9 ACUTE BRONCHITIS, UNSPECIFIED ORGANISM: Primary | ICD-10-CM

## 2024-09-27 PROCEDURE — 99213 OFFICE O/P EST LOW 20 MIN: CPT

## 2024-09-27 PROCEDURE — G2211 COMPLEX E/M VISIT ADD ON: HCPCS

## 2024-09-27 RX ORDER — METHYLPREDNISOLONE 4 MG
TABLET, DOSE PACK ORAL
Qty: 21 TABLET | Refills: 0 | Status: SHIPPED | OUTPATIENT
Start: 2024-09-27

## 2024-09-27 RX ORDER — BENZONATATE 100 MG/1
100 CAPSULE ORAL 3 TIMES DAILY PRN
Qty: 30 CAPSULE | Refills: 0 | Status: SHIPPED | OUTPATIENT
Start: 2024-09-27 | End: 2024-10-07

## 2024-09-27 RX ORDER — AZITHROMYCIN 250 MG/1
TABLET, FILM COATED ORAL
Qty: 6 TABLET | Refills: 0 | Status: SHIPPED | OUTPATIENT
Start: 2024-09-27 | End: 2024-10-02

## 2024-09-27 ASSESSMENT — ENCOUNTER SYMPTOMS: COUGH: 1

## 2024-09-27 NOTE — PROGRESS NOTES
Assessment & Plan     (J20.9) Acute bronchitis, unspecified organism  (primary encounter diagnosis)  Comment: Acute and stable. No signs of respiratory distress, vital signs stable, and no red flag signs requiring immediate need for medical attention.  Lung sounds are clear, no concerns for pneumonia, no findings that would warrant a chest x-ray, and educated patient that we would forego viral respiratory panel today, as it will not change our plan of care moving forward.  HPI and physical exam findings are supportive of the diagnosis of acute bronchitis, so we will move forward with Medrol Dosepak specifically due to intermittent wheezing in the right upper lobe and to reduce airway inflammation.  Will manage cough with Tessalon Perles, and send patient with a walking prescription for Z-Ismael.  Educated him that I would only like him to fill this if he spikes a fever at any time, or if symptoms do not improve or worsen in 5 days. Offered education on medications including appropriate dosing, possible side effects, and possible adverse effects.  Education given on return to clinic instructions as well as alarm signs that would require the need for immediate medical attention.  Patient attested to understanding.  Plan: azithromycin (ZITHROMAX) 250 MG tablet,         benzonatate (TESSALON) 100 MG capsule,         methylPREDNISolone (MEDROL DOSEPAK) 4 MG tablet        therapy pack      This progress note has been dictated, with use of voice recognition software. Any grammatical, typographical, or context errors are unintentional and inherent to use of voice recognition software.     Prescription drug management  I spent a total of 10 minutes on the day of the visit.   Time spent by me doing chart review, history and exam, documentation and further activities per the note    FUTURE APPOINTMENTS:       - Follow-up visit in 1 week if symptoms worsen or do not improve       - Follow-up for annual visit or as  needed  Patient Instructions   There were no signs of pneumonia.    Your symptoms are most likely due to acute bronchitis.  This is inflammation of the tubes leading into the lungs, most often due to a viral infection and an antibiotic will not help this.    I have also prescribed a medication called Tessalon Perels. This is a cough medication that can be useful to prevent coughing jags overnight, and help you get a good nights rest. This medication can be used during the day, but it can often make people a bit drowsy, so I would encourage you to take it in the evening for the first time to determine how your body reacts to the medication.    I have printed a prescription for an antibiotic.  If you have no improvement over the next 5-7 days, develop fevers you may fill this prescription.  If you do fill it, please finish all the medication.      Please monitor symptoms carefully.  If developing chest pain, shortness of breath, fever, coughing up blood, extreme fatigue, or any other new, concerning symptoms, come back to clinic or go to ER immediately.  Otherwise, if no improvement in symptoms in one week, follow-up with your primary care provider.    Prednisone Discharge Instructions:  Please take the steroid, Prednisone, for the full course as prescribed.  Take Prednisone with food or milk to minimize stomach upset.      Side effects of Prednisone include difficulty sleeping, increased appetite, weight gain, and changes in mood.  If you are diabetic, please monitor your blood sugar regularly while taking this medicine as Prednisone can cause high blood sugar.    Continue to monitor your symptoms and manage with the home remedies listed below:    -Pain Medication: take tylenol and/or ibuprofen per the label instructions to manage pain and discomfort    -Rest: get adequate rest including 7-8 hours of sleep, and low activity levels during the day to encourage healing. Avoid high impact exercise and rigorous physical  labor    -Nutrition: support healing by fueling your body with healthy foods. Fruits, vegetables, and whole foods are all great options!    -Hydration: increase fluid intake. Illness leads to increased dehydration, so your body needs more fluid intake than it might when you are healthy. Boyce and sugar free teas are great options. Try to avoid beverages that cause more dehydration including coffee, soda, energy drinks, and alcohol.            Subjective   Charles is a 73 year old, presenting for the following health issues:  Cough (Persistent dry cough along with running nose x 1 week - tested negative for Covid x 6 days ago )        9/27/2024     7:33 AM   Additional Questions   Roomed by GARDENIA Landers   Accompanied by alone         9/27/2024     7:33 AM   Patient Reported Additional Medications   Patient reports taking the following new medications none     History of Present Illness       Reason for visit:  Cough  Symptom onset:  1-2 weeks ago   He is taking medications regularly.     Charles is a 73-year-old male with a past medical history significant for CHASE on CPAP, hyperlipidemia, cervical spondylosis without myelopathy, and history of colon polyps who presents today with concerns for 8 days of cough and nasal congestion.  Patient reports that approximately 8 days ago he began to experience a cough that is intermittently productive to clear to white phlegm, and this was also accompanied by a runny nose, mild nasal congestion, and general fatigue.  He declines that the cough is causing him concerns for chest pain or shortness of breath, but he does attest to some occasional wheezing.  He has not had a fever throughout this illness course, and declines any headache, blurry or double vision, ear pain or discharge, difficulty swallowing, sore throat, significant chest congestion, abdominal pain, nausea, vomiting, or diarrhea.  He was [place] when all of this began, and declines that anyone  "else on the trip or his spouse had similar symptoms.  He has tested himself for COVID, and this resulted negative.  He has been taking NyQuil and Advil very intermittently for symptom management, and mainly uses this only to get a good night sleep.  He is waking throughout the night with coughing, but declines any orthopnea, and attest to the fact that he continues to cough in the daytime hours as well.  He is still eating and drinking well.        Review of Systems  Constitutional, HEENT, cardiovascular, pulmonary, gi and gu systems are negative, except as otherwise noted.      Objective    /60 (BP Location: Left arm, Patient Position: Sitting, Cuff Size: Adult Regular)   Pulse 61   Temp 97.8  F (36.6  C) (Tympanic)   Resp 15   Ht 1.88 m (6' 2\")   Wt 95.7 kg (211 lb)   SpO2 96%   BMI 27.09 kg/m    Body mass index is 27.09 kg/m .  Physical Exam   GENERAL: alert and no distress  EYES: Eyes grossly normal to inspection, PERRL and conjunctivae and sclerae normal  HENT: ear canals and TM's normal, nose and mouth without ulcers or lesions.  Mild nasal mucosa edema  NECK: no adenopathy, no asymmetry, masses, or scars  RESP: Easy rate, depth, and work of breathing without use of accessory muscles.  No rales or rhonchi, very intermittent wheezing in right upper lobe  CV: regular rate and rhythm, normal S1 S2, no S3 or S4, no murmur, click or rub, no peripheral edema  ABDOMEN: soft, nontender, no hepatosplenomegaly, no masses and bowel sounds normal  MS: no gross musculoskeletal defects noted, no edema  NEURO: Normal strength and tone, mentation intact and speech normal    Roseanne Sanchez DNP FNP-C  Family Nurse Practitioner - Same Day Provider  [facility] [place]        Signed Electronically by: MAIKEL Newton CNP    "

## 2024-09-27 NOTE — PATIENT INSTRUCTIONS
There were no signs of pneumonia.    Your symptoms are most likely due to acute bronchitis.  This is inflammation of the tubes leading into the lungs, most often due to a viral infection and an antibiotic will not help this.    I have also prescribed a medication called Tessalon Perels. This is a cough medication that can be useful to prevent coughing jags overnight, and help you get a good nights rest. This medication can be used during the day, but it can often make people a bit drowsy, so I would encourage you to take it in the evening for the first time to determine how your body reacts to the medication.    I have printed a prescription for an antibiotic.  If you have no improvement over the next 5-7 days, develop fevers you may fill this prescription.  If you do fill it, please finish all the medication.      Please monitor symptoms carefully.  If developing chest pain, shortness of breath, fever, coughing up blood, extreme fatigue, or any other new, concerning symptoms, come back to clinic or go to ER immediately.  Otherwise, if no improvement in symptoms in one week, follow-up with your primary care provider.    Prednisone Discharge Instructions:  Please take the steroid, Prednisone, for the full course as prescribed.  Take Prednisone with food or milk to minimize stomach upset.      Side effects of Prednisone include difficulty sleeping, increased appetite, weight gain, and changes in mood.  If you are diabetic, please monitor your blood sugar regularly while taking this medicine as Prednisone can cause high blood sugar.    Continue to monitor your symptoms and manage with the home remedies listed below:    -Pain Medication: take tylenol and/or ibuprofen per the label instructions to manage pain and discomfort    -Rest: get adequate rest including 7-8 hours of sleep, and low activity levels during the day to encourage healing. Avoid high impact exercise and rigorous physical labor    -Nutrition: support  healing by fueling your body with healthy foods. Fruits, vegetables, and whole foods are all great options!    -Hydration: increase fluid intake. Illness leads to increased dehydration, so your body needs more fluid intake than it might when you are healthy. Boyce and sugar free teas are great options. Try to avoid beverages that cause more dehydration including coffee, soda, energy drinks, and alcohol.

## 2024-10-14 NOTE — PROGRESS NOTES
AUDIOLOGY REPORT    SUBJECTIVE:  Delmar Moreno is a 73 year old male who was seen in the Audiology Clinic at the [facility] for audiologic evaluation, referred by Ryan Byrd M.D. Patient is here for a recheck of hearing. He had cerumen removed by his primary care provider 07/25/2024. Prior audiologic evaluation 06/22/2023 showed normal hearing sloping to severe sensorineural hearing loss, bilaterally. Today, the patient reports continued difficulty hearing particularly in noise. He has continued bilateral nonbothersome buzzing tinnitus. Both of the patient's parents wore hearing aids with age. Denies otalgia, otorrhea, aural fullness, ear surgery, or noise exposure.    OBJECTIVE:  Abuse Screening:  Do you feel unsafe at home or work/school? No  Do you feel threatened by someone? No  Does anyone try to keep you from having contact with others, or doing things outside of your home? No  Physical signs of abuse present? No     Fall Risk Screen:  1. Have you fallen two or more times in the past year? No  2. Have you fallen and had an injury in the past year? No    Otoscopic exam indicates ears are clear of cerumen bilaterally     Pure Tone Thresholds assessed using conventional audiometry with good reliability from 250-8000 Hz bilaterally using insert earphones and circumaural headphones     RIGHT:  Normal hearing sloping to severe sensorineural hearing loss    LEFT:    Normal hearing sloping to moderately-severe sensorineural hearing loss    Tympanogram:    RIGHT: hypercompliant    LEFT:   hypercompliant    Reflexes (reported by stimulus ear):  RIGHT: Ipsilateral is present at normal levels  RIGHT: Contralateral is present at normal levels  LEFT:   Ipsilateral is present at normal levels  LEFT:   Contralateral is present at elevated levels    Speech Reception Threshold:    RIGHT: 15 dB HL    LEFT:   10 dB HL  Word Recognition Score:     RIGHT: 96% at 70 dB HL using NU-6 recorded word  list.    LEFT:   96% at 70 dB HL using NU-6 recorded word list.      ASSESSMENT:   Normal hearing sloping to severe sensorineural hearing loss, bilaterally. Compared to patient's previous audiogram dated 06/22/2023, hearing has remained largely stable. Today s results were discussed with the patient in detail.     PLAN:  Patient was counseled regarding hearing loss and impact on communication. Patient is a good candidate for amplification at this time. It is recommended that the patient call his insurance to determine his benefits for hearing aids, and then call to schedule an appointment for a Hearing Aid Consultation, if interested. Recommend recheck of hearing in 1 year, or sooner should concerns arise. Please call this clinic with questions regarding these results or recommendations.      Nuzhat Wilhelm., [facility]  [place] Licensed Audiologist #[id number]

## 2024-10-17 ENCOUNTER — OFFICE VISIT (OUTPATIENT)
Dept: AUDIOLOGY | Facility: CLINIC | Age: 74
End: 2024-10-17
Payer: COMMERCIAL

## 2024-10-17 DIAGNOSIS — H90.3 BILATERAL SENSORINEURAL HEARING LOSS: ICD-10-CM

## 2024-10-17 PROCEDURE — 92557 COMPREHENSIVE HEARING TEST: CPT | Performed by: AUDIOLOGIST

## 2024-10-17 PROCEDURE — 92550 TYMPANOMETRY & REFLEX THRESH: CPT | Performed by: AUDIOLOGIST

## 2025-04-27 ENCOUNTER — HEALTH MAINTENANCE LETTER (OUTPATIENT)
Age: 75
End: 2025-04-27

## 2025-06-30 ENCOUNTER — PATIENT OUTREACH (OUTPATIENT)
Dept: CARE COORDINATION | Facility: CLINIC | Age: 75
End: 2025-06-30
Payer: COMMERCIAL

## 2025-07-14 ENCOUNTER — PATIENT OUTREACH (OUTPATIENT)
Dept: CARE COORDINATION | Facility: CLINIC | Age: 75
End: 2025-07-14
Payer: COMMERCIAL

## 2025-08-31 ENCOUNTER — HEALTH MAINTENANCE LETTER (OUTPATIENT)
Age: 75
End: 2025-08-31

## (undated) RX ORDER — FENTANYL CITRATE 50 UG/ML
INJECTION, SOLUTION INTRAMUSCULAR; INTRAVENOUS
Status: DISPENSED
Start: 2023-10-12